# Patient Record
Sex: FEMALE | Race: WHITE | NOT HISPANIC OR LATINO | Employment: OTHER | URBAN - METROPOLITAN AREA
[De-identification: names, ages, dates, MRNs, and addresses within clinical notes are randomized per-mention and may not be internally consistent; named-entity substitution may affect disease eponyms.]

---

## 2017-08-02 ENCOUNTER — TELEPHONE (OUTPATIENT)
Dept: ENDOCRINOLOGY | Facility: CLINIC | Age: 27
End: 2017-08-02

## 2017-08-02 NOTE — TELEPHONE ENCOUNTER
----- Message from Marlene Selby sent at 8/2/2017  1:05 PM CDT -----  Contact: pt  [employer]      Deploying to [facility] 8/8    Can she be worked in   For female  dysthoria     Please call [phone]  Either way     Thanks

## 2017-08-03 ENCOUNTER — PATIENT MESSAGE (OUTPATIENT)
Dept: ENDOCRINOLOGY | Facility: CLINIC | Age: 27
End: 2017-08-03

## 2017-08-03 ENCOUNTER — LAB VISIT (OUTPATIENT)
Dept: LAB | Facility: HOSPITAL | Age: 27
End: 2017-08-03
Attending: INTERNAL MEDICINE
Payer: OTHER GOVERNMENT

## 2017-08-03 ENCOUNTER — OFFICE VISIT (OUTPATIENT)
Dept: ENDOCRINOLOGY | Facility: CLINIC | Age: 27
End: 2017-08-03
Payer: OTHER GOVERNMENT

## 2017-08-03 VITALS
HEIGHT: 64 IN | HEART RATE: 64 BPM | DIASTOLIC BLOOD PRESSURE: 80 MMHG | WEIGHT: 165.13 LBS | SYSTOLIC BLOOD PRESSURE: 110 MMHG | BODY MASS INDEX: 28.19 KG/M2

## 2017-08-03 DIAGNOSIS — R79.89 ABNORMAL THYROID BLOOD TEST: Primary | ICD-10-CM

## 2017-08-03 LAB
25(OH)D3+25(OH)D2 SERPL-MCNC: 28 NG/ML
ALBUMIN SERPL BCP-MCNC: 4 G/DL
ALP SERPL-CCNC: 78 U/L
ALT SERPL W/O P-5'-P-CCNC: 75 U/L
ANION GAP SERPL CALC-SCNC: 7 MMOL/L
AST SERPL-CCNC: 39 U/L
BASOPHILS # BLD AUTO: 0.02 K/UL
BASOPHILS NFR BLD: 0.3 %
BILIRUB SERPL-MCNC: 0.8 MG/DL
BUN SERPL-MCNC: 10 MG/DL
CALCIUM SERPL-MCNC: 9.6 MG/DL
CHLORIDE SERPL-SCNC: 105 MMOL/L
CHOLEST/HDLC SERPL: 3.2 {RATIO}
CO2 SERPL-SCNC: 28 MMOL/L
CREAT SERPL-MCNC: 0.8 MG/DL
DIFFERENTIAL METHOD: ABNORMAL
EOSINOPHIL # BLD AUTO: 0.1 K/UL
EOSINOPHIL NFR BLD: 0.9 %
ERYTHROCYTE [DISTWIDTH] IN BLOOD BY AUTOMATED COUNT: 12.7 %
EST. GFR  (AFRICAN AMERICAN): >60 ML/MIN/1.73 M^2
EST. GFR  (NON AFRICAN AMERICAN): >60 ML/MIN/1.73 M^2
ESTIMATED AVG GLUCOSE: 94 MG/DL
GLUCOSE SERPL-MCNC: 85 MG/DL
HBA1C MFR BLD HPLC: 4.9 %
HCT VFR BLD AUTO: 39.7 %
HDL/CHOLESTEROL RATIO: 31.3 %
HDLC SERPL-MCNC: 195 MG/DL
HDLC SERPL-MCNC: 61 MG/DL
HGB BLD-MCNC: 13.6 G/DL
LDLC SERPL CALC-MCNC: 121.4 MG/DL
LYMPHOCYTES # BLD AUTO: 1.6 K/UL
LYMPHOCYTES NFR BLD: 26.5 %
MCH RBC QN AUTO: 32.2 PG
MCHC RBC AUTO-ENTMCNC: 34.3 G/DL
MCV RBC AUTO: 94 FL
MONOCYTES # BLD AUTO: 0.2 K/UL
MONOCYTES NFR BLD: 3.9 %
NEUTROPHILS # BLD AUTO: 4 K/UL
NEUTROPHILS NFR BLD: 68.2 %
NONHDLC SERPL-MCNC: 134 MG/DL
PLATELET # BLD AUTO: 260 K/UL
PMV BLD AUTO: 10.2 FL
POTASSIUM SERPL-SCNC: 4.6 MMOL/L
PROT SERPL-MCNC: 7.7 G/DL
RBC # BLD AUTO: 4.22 M/UL
SODIUM SERPL-SCNC: 140 MMOL/L
T4 FREE SERPL-MCNC: 0.71 NG/DL
TRIGL SERPL-MCNC: 63 MG/DL
TSH SERPL DL<=0.005 MIU/L-ACNC: 4.28 UIU/ML
WBC # BLD AUTO: 5.85 K/UL

## 2017-08-03 PROCEDURE — 99999 PR PBB SHADOW E&M-EST. PATIENT-LVL III: CPT | Mod: PBBFAC,,, | Performed by: INTERNAL MEDICINE

## 2017-08-03 PROCEDURE — 36415 COLL VENOUS BLD VENIPUNCTURE: CPT

## 2017-08-03 PROCEDURE — 80053 COMPREHEN METABOLIC PANEL: CPT

## 2017-08-03 PROCEDURE — 3008F BODY MASS INDEX DOCD: CPT | Mod: ,,, | Performed by: INTERNAL MEDICINE

## 2017-08-03 PROCEDURE — 99204 OFFICE O/P NEW MOD 45 MIN: CPT | Mod: S$PBB,,, | Performed by: INTERNAL MEDICINE

## 2017-08-03 PROCEDURE — 80061 LIPID PANEL: CPT

## 2017-08-03 PROCEDURE — 84439 ASSAY OF FREE THYROXINE: CPT

## 2017-08-03 PROCEDURE — 85025 COMPLETE CBC W/AUTO DIFF WBC: CPT

## 2017-08-03 PROCEDURE — 82306 VITAMIN D 25 HYDROXY: CPT

## 2017-08-03 PROCEDURE — 83036 HEMOGLOBIN GLYCOSYLATED A1C: CPT

## 2017-08-03 PROCEDURE — 84443 ASSAY THYROID STIM HORMONE: CPT

## 2017-08-03 RX ORDER — TESTOSTERONE CYPIONATE 200 MG/ML
50 INJECTION, SOLUTION INTRAMUSCULAR
Qty: 10 ML | Refills: 5 | Status: SHIPPED | OUTPATIENT
Start: 2017-08-03 | End: 2017-10-26 | Stop reason: SDUPTHER

## 2017-08-03 RX ORDER — TESTOSTERONE CYPIONATE 200 MG/ML
50 INJECTION, SOLUTION INTRAMUSCULAR
Status: COMPLETED | OUTPATIENT
Start: 2017-08-03 | End: 2017-08-03

## 2017-08-03 RX ADMIN — TESTOSTERONE CYPIONATE 50 MG: 200 INJECTION, SOLUTION INTRAMUSCULAR at 10:08

## 2017-08-03 NOTE — PROGRESS NOTES
Subjective:      Patient ID: Jacey Sosa is a 26 y.o. transman.    Chief Complaint:  Other (Gender dysphoria)      History of Present Illness   With regards to his gender incongruence care:  He is in the  and will be deployed soon to [place]     First identified as a man in early his early teens   Gender identity - male  Current Therapy / counselor - Nicole Ingram, PhD Kady KEENAN    Therapist at the time hormones were started:  Yes   Letter was provided attesting to readiness for TRT      Gender Surgeries - none, but interested  Desired interventions:  Top surgery       Fertility concerns - may be interested but understands risks of trt     Has not Started hormone therapy want to today :        LMP:  Mid July 2017   Had a gyn eval in the past - last was end of July 2017          Social:  Work -        Relationship, orientation -  Single currently. With men and women           No dx of depression  Or anxiety     New concerns today:  None            Review of Systems   Constitutional: Negative for unexpected weight change.   Eyes: Negative for visual disturbance.   Respiratory: Negative for shortness of breath.    Cardiovascular: Negative for chest pain.   Gastrointestinal: Negative for abdominal pain.   Musculoskeletal: Negative for arthralgias.   Skin: Negative for wound.   Neurological: Negative for headaches.   Hematological: Does not bruise/bleed easily.   Psychiatric/Behavioral: Negative for sleep disturbance.       Objective:   Physical Exam   Constitutional: She appears well-developed.   HENT:   Right Ear: External ear normal.   Left Ear: External ear normal.   Nose: Nose normal.   Hearing normal  Dentition good    Neck: No tracheal deviation present. No thyromegaly present.   Cardiovascular: Normal rate.    No murmur heard.  Pulmonary/Chest: Effort normal and breath sounds normal.   Abdominal: Soft. There is no tenderness. No hernia.   Musculoskeletal: She exhibits no edema.   Gait  normal  No clubbing or cyanosis noted   Neurological: She displays normal reflexes. No cranial nerve deficit.   Skin: No rash noted.   No nodules       Psychiatric: She has a normal mood and affect. Judgment normal.   Vitals reviewed.      Body mass index is 28.34 kg/m².    Lab Review:   No results found for: HGBA1C  No results found for: CHOL, HDL, LDLCALC, TRIG, CHOLHDL  No results found for: NA, K, CL, CO2, GLU, BUN, CREATININE, CALCIUM, PROT, ALBUMIN, BILITOT, ALKPHOS, AST, ALT, ANIONGAP, ESTGFRAFRICA, EGFRNONAA, TSH    Assessment and Plan     Endocrine disorder in female-to-male transgender person  Transman: gender incongruence   Reviewed therapy, side effects (both wanted and unwanted), possible adverse outcomes, expectations, compliance.  Reviewed limited data on fertility, reviewed that fertility may be compromised with therapy over time. recommend consideration of egg preservation and a consult with a fertility specialist.     Reviewed the need for contraception as testosterone is not a contraceptive if having vaginal sex with non-trans men      Patient encouraged to continue working with his therapist during the transition process.      Will start   Testosterone replacement therapy 50 mg q 1 week   Self administration taught in clinic today.  Gave testosterone 50 mg im once   Labs in 3 months (external)   rtc 6 months as he will be in [facility]   Noting  Nl hh/ for men is:     Hemoglobin 14.0-18.0 g/dL    Hematocrit 40.0-54.0 %        Healthy lifestyle stressed   Consent signed in clinic    discussed that if acne is an issue and Accutane is used he would need to sign iPledge

## 2017-08-03 NOTE — PROGRESS NOTES
Depo testosterone administered per MAR.  Patient supplied medication Rx, IM injection teaching given to patient verbalized understanding.

## 2017-08-03 NOTE — ASSESSMENT & PLAN NOTE
Transman: gender incongruence   Reviewed therapy, side effects (both wanted and unwanted), possible adverse outcomes, expectations, compliance.  Reviewed limited data on fertility, reviewed that fertility will likely be compromised with therapy over time. recommend consideration of egg preservation and a consult with a fertility specialist.     Reviewed the need for contraception as testosterone is not a contraceptive if having vaginal sex with non-trans men      Patient encouraged to continue working with his therapist during the transition process.      Will start   Testosterone replacement therapy 50 mg q 1 week   Self administration taught in clinic today.  Gave testosterone 50 mg im once   Labs in 3 months (external)   rtc 6 months as he will be in [facility]   Noting  Nl hh/ for men is:     Hemoglobin 14.0-18.0 g/dL    Hematocrit 40.0-54.0 %        Healthy lifestyle stressed   Consent signed in clinic    discussed that if acne is an issue and Accutane is used he would need to sign iPledge

## 2017-08-08 ENCOUNTER — PATIENT MESSAGE (OUTPATIENT)
Dept: ENDOCRINOLOGY | Facility: CLINIC | Age: 27
End: 2017-08-08

## 2017-08-20 ENCOUNTER — PATIENT MESSAGE (OUTPATIENT)
Dept: ENDOCRINOLOGY | Facility: CLINIC | Age: 27
End: 2017-08-20

## 2017-08-21 ENCOUNTER — TELEPHONE (OUTPATIENT)
Dept: ENDOCRINOLOGY | Facility: CLINIC | Age: 27
End: 2017-08-21

## 2017-08-21 NOTE — TELEPHONE ENCOUNTER
Attempted to call patient regarding a question about medication being absorbed post injection. Unable to leave message due to voicemail saying this person is unavailable and cannot receive messages at this time.

## 2017-10-23 ENCOUNTER — PATIENT MESSAGE (OUTPATIENT)
Dept: ENDOCRINOLOGY | Facility: CLINIC | Age: 27
End: 2017-10-23

## 2017-10-26 ENCOUNTER — PATIENT MESSAGE (OUTPATIENT)
Dept: DIABETES | Facility: CLINIC | Age: 27
End: 2017-10-26

## 2017-10-26 ENCOUNTER — PATIENT MESSAGE (OUTPATIENT)
Dept: ENDOCRINOLOGY | Facility: CLINIC | Age: 27
End: 2017-10-26

## 2017-10-26 DIAGNOSIS — E55.9 VITAMIN D DEFICIENCY: ICD-10-CM

## 2017-10-26 DIAGNOSIS — R79.89 ABNORMAL THYROID BLOOD TEST: ICD-10-CM

## 2017-10-26 RX ORDER — TESTOSTERONE CYPIONATE 200 MG/ML
100 INJECTION, SOLUTION INTRAMUSCULAR
Qty: 10 ML | Refills: 5 | Status: SHIPPED | OUTPATIENT
Start: 2017-10-26 | End: 2018-02-15 | Stop reason: SDUPTHER

## 2017-10-26 NOTE — TELEPHONE ENCOUNTER
Patient getting labs done by their primary care doctor. Was notified of the additional labs that are ordered via patient portal

## 2017-11-10 ENCOUNTER — PATIENT MESSAGE (OUTPATIENT)
Dept: ENDOCRINOLOGY | Facility: CLINIC | Age: 27
End: 2017-11-10

## 2017-11-14 ENCOUNTER — PATIENT MESSAGE (OUTPATIENT)
Dept: ENDOCRINOLOGY | Facility: CLINIC | Age: 27
End: 2017-11-14

## 2017-12-05 ENCOUNTER — TELEPHONE (OUTPATIENT)
Dept: ENDOCRINOLOGY | Facility: CLINIC | Age: 27
End: 2017-12-05

## 2017-12-05 NOTE — TELEPHONE ENCOUNTER
----- Message from Carly Valdivia sent at 12/5/2017 10:48 AM CST -----  Contact: Lesley / .[facility], [phone]  Lesley is requesting clinical notes on Pt.     [phone] (fax)

## 2018-01-19 ENCOUNTER — PATIENT MESSAGE (OUTPATIENT)
Dept: ENDOCRINOLOGY | Facility: CLINIC | Age: 28
End: 2018-01-19

## 2018-02-08 ENCOUNTER — LAB VISIT (OUTPATIENT)
Dept: LAB | Facility: HOSPITAL | Age: 28
End: 2018-02-08
Attending: INTERNAL MEDICINE
Payer: OTHER GOVERNMENT

## 2018-02-08 DIAGNOSIS — R79.89 ABNORMAL THYROID BLOOD TEST: ICD-10-CM

## 2018-02-08 DIAGNOSIS — E55.9 VITAMIN D DEFICIENCY: ICD-10-CM

## 2018-02-08 LAB
ALBUMIN SERPL BCP-MCNC: 3.7 G/DL
ALP SERPL-CCNC: 57 U/L
ALT SERPL W/O P-5'-P-CCNC: 37 U/L
ANION GAP SERPL CALC-SCNC: 7 MMOL/L
AST SERPL-CCNC: 32 U/L
BASOPHILS # BLD AUTO: 0 K/UL
BASOPHILS NFR BLD: 0.5 %
BILIRUB SERPL-MCNC: 0.3 MG/DL
BUN SERPL-MCNC: 10 MG/DL
CALCIUM SERPL-MCNC: 9.1 MG/DL
CHLORIDE SERPL-SCNC: 106 MMOL/L
CHOLEST SERPL-MCNC: 185 MG/DL
CHOLEST/HDLC SERPL: 3.9 {RATIO}
CO2 SERPL-SCNC: 27 MMOL/L
CREAT SERPL-MCNC: 0.8 MG/DL
DIFFERENTIAL METHOD: ABNORMAL
EOSINOPHIL # BLD AUTO: 0.1 K/UL
EOSINOPHIL NFR BLD: 1.7 %
ERYTHROCYTE [DISTWIDTH] IN BLOOD BY AUTOMATED COUNT: 14.1 %
EST. GFR  (AFRICAN AMERICAN): >60 ML/MIN/1.73 M^2
EST. GFR  (NON AFRICAN AMERICAN): >60 ML/MIN/1.73 M^2
GLUCOSE SERPL-MCNC: 88 MG/DL
HCT VFR BLD AUTO: 45.3 %
HDLC SERPL-MCNC: 48 MG/DL
HDLC SERPL: 25.9 %
HGB BLD-MCNC: 14.9 G/DL
LDLC SERPL CALC-MCNC: 113.8 MG/DL
LYMPHOCYTES # BLD AUTO: 2 K/UL
LYMPHOCYTES NFR BLD: 29.9 %
MCH RBC QN AUTO: 31.3 PG
MCHC RBC AUTO-ENTMCNC: 33 G/DL
MCV RBC AUTO: 95 FL
MONOCYTES # BLD AUTO: 0.6 K/UL
MONOCYTES NFR BLD: 8.8 %
NEUTROPHILS # BLD AUTO: 4 K/UL
NEUTROPHILS NFR BLD: 59.1 %
NONHDLC SERPL-MCNC: 137 MG/DL
PLATELET # BLD AUTO: 242 K/UL
PMV BLD AUTO: 8.9 FL
POTASSIUM SERPL-SCNC: 3.9 MMOL/L
PROT SERPL-MCNC: 7.6 G/DL
RBC # BLD AUTO: 4.78 M/UL
SODIUM SERPL-SCNC: 140 MMOL/L
TESTOST SERPL-MCNC: 782 NG/DL
THYROPEROXIDASE IGG SERPL-ACNC: <6 IU/ML
TRIGL SERPL-MCNC: 116 MG/DL
TSH SERPL DL<=0.005 MIU/L-ACNC: 1.12 UIU/ML
WBC # BLD AUTO: 6.8 K/UL

## 2018-02-08 PROCEDURE — 80053 COMPREHEN METABOLIC PANEL: CPT

## 2018-02-08 PROCEDURE — 84403 ASSAY OF TOTAL TESTOSTERONE: CPT

## 2018-02-08 PROCEDURE — 80061 LIPID PANEL: CPT

## 2018-02-08 PROCEDURE — 84443 ASSAY THYROID STIM HORMONE: CPT

## 2018-02-08 PROCEDURE — 85025 COMPLETE CBC W/AUTO DIFF WBC: CPT

## 2018-02-08 PROCEDURE — 86376 MICROSOMAL ANTIBODY EACH: CPT

## 2018-02-08 PROCEDURE — 36415 COLL VENOUS BLD VENIPUNCTURE: CPT

## 2018-02-15 ENCOUNTER — OFFICE VISIT (OUTPATIENT)
Dept: ENDOCRINOLOGY | Facility: CLINIC | Age: 28
End: 2018-02-15
Payer: OTHER GOVERNMENT

## 2018-02-15 VITALS
SYSTOLIC BLOOD PRESSURE: 110 MMHG | DIASTOLIC BLOOD PRESSURE: 80 MMHG | HEART RATE: 84 BPM | BODY MASS INDEX: 28.34 KG/M2 | HEIGHT: 64 IN | WEIGHT: 166 LBS

## 2018-02-15 DIAGNOSIS — E55.9 VITAMIN D DEFICIENCY: ICD-10-CM

## 2018-02-15 DIAGNOSIS — E03.9 ACQUIRED HYPOTHYROIDISM: ICD-10-CM

## 2018-02-15 PROCEDURE — 99213 OFFICE O/P EST LOW 20 MIN: CPT | Mod: PBBFAC | Performed by: INTERNAL MEDICINE

## 2018-02-15 PROCEDURE — 3008F BODY MASS INDEX DOCD: CPT | Mod: ,,, | Performed by: INTERNAL MEDICINE

## 2018-02-15 PROCEDURE — 99999 PR PBB SHADOW E&M-EST. PATIENT-LVL III: CPT | Mod: PBBFAC,,, | Performed by: INTERNAL MEDICINE

## 2018-02-15 PROCEDURE — 99214 OFFICE O/P EST MOD 30 MIN: CPT | Mod: S$PBB,,, | Performed by: INTERNAL MEDICINE

## 2018-02-15 RX ORDER — TESTOSTERONE CYPIONATE 200 MG/ML
100 INJECTION, SOLUTION INTRAMUSCULAR
Qty: 10 ML | Refills: 5 | Status: SHIPPED | OUTPATIENT
Start: 2018-02-15 | End: 2018-11-07 | Stop reason: SDUPTHER

## 2018-02-15 RX ORDER — LEVOTHYROXINE SODIUM 50 UG/1
50 TABLET ORAL 2 TIMES DAILY
COMMUNITY
End: 2019-02-06

## 2018-02-15 RX ORDER — ACETAMINOPHEN 500 MG
1 TABLET ORAL DAILY
Start: 2018-02-15 | End: 2019-02-06

## 2018-02-15 NOTE — ASSESSMENT & PLAN NOTE
Clinically and biochemically euthyroid  Goal is a normal TSH  Avoid exogenous hyperthyroidism as this can accelerate bone loss and increase risk of CV complications.    Reviewed usual  times of thyroid hormone  Changes  Check yearly

## 2018-02-15 NOTE — ASSESSMENT & PLAN NOTE
Transman: gender incongruence   Reviewed therapy, side effects (both wanted and unwanted), possible adverse outcomes, expectations, compliance.  Reviewed limited data on fertility     Reviewed the need for contraception as testosterone is not a contraceptive if having vaginal sex with non-trans men       Will  Continue Testosterone replacement therapy 100 mg q 1 week      rtc 12 months    Noting  Nl hh/ for men is:     Hemoglobin 14.0-18.0 g/dL    Hematocrit 40.0-54.0 %        Healthy lifestyle stressed

## 2018-02-15 NOTE — PROGRESS NOTES
Subjective:      Patient ID: Jacey Sosa is a 27 y.o. female.    Chief Complaint:  Gender dysphoria      History of Present Illness  With regards to his gender incongruence care:  He is in the  and will be deployed soon to [place]      First identified as a man in early his early teens   Gender identity - male        Therapist at the time hormones were started:  Yes Therapy / counselor - Nicole Ingram, PhD Kady KEENAN - not currently seeing anyone   Letter was provided attesting to readiness for TRT       Gender Surgeries - none, but interested  Desired interventions:  Top surgery         Fertility concerns - may be interested but understands risks of trt      We started   Testosterone replacement therapy 8/3/17  Current dose  100 mg q 1 week         LMP: current   Had a gyn eval in the past - last was end of July 2017           Social:  Work -        Relationship, orientation -   currently with a woman. With men and women           No dx of depression  Or anxiety      New concerns today:  None       With regards to the vitamin d deficiency:  Currently not taking otc 2000 iu a day       With regards to  The hypothyroidism:    Current medication:     generic lt4 100 mcg qd   Was found to have an elevated tsh on routine labs  tpo was negative      current symptoms:  None      No weight gain  No Fatigue   No Constipation               Review of Systems   Constitutional: Negative for unexpected weight change.   Eyes: Negative for visual disturbance.   Respiratory: Negative for shortness of breath.    Cardiovascular: Negative for chest pain.   Gastrointestinal: Negative for abdominal pain.   Musculoskeletal: Negative for arthralgias.   Skin: Negative for wound.   Neurological: Negative for headaches.   Hematological: Does not bruise/bleed easily.   Psychiatric/Behavioral: Negative for sleep disturbance.       Objective:   Physical Exam   Neck: No thyromegaly present.   Cardiovascular: Normal rate.     Pulmonary/Chest: Effort normal.   Abdominal: Soft.   Musculoskeletal: She exhibits no edema.   Vitals reviewed.      Body mass index is 28.49 kg/m².    Lab Review:   Lab Results   Component Value Date    HGBA1C 4.9 08/03/2017     Lab Results   Component Value Date    CHOL 185 02/08/2018    HDL 48 02/08/2018    LDLCALC 113.8 02/08/2018    TRIG 116 02/08/2018    CHOLHDL 25.9 02/08/2018     Lab Results   Component Value Date     02/08/2018    K 3.9 02/08/2018     02/08/2018    CO2 27 02/08/2018    GLU 88 02/08/2018    BUN 10 02/08/2018    CREATININE 0.8 02/08/2018    CALCIUM 9.1 02/08/2018    PROT 7.6 02/08/2018    ALBUMIN 3.7 02/08/2018    BILITOT 0.3 02/08/2018    ALKPHOS 57 02/08/2018    AST 32 02/08/2018    ALT 37 02/08/2018    ANIONGAP 7 (L) 02/08/2018    ESTGFRAFRICA >60 02/08/2018    EGFRNONAA >60 02/08/2018    TSH 1.123 02/08/2018       Assessment and Plan     Vitamin D deficiency  Start over the counter vitamin D 2000 iu a day          Acquired hypothyroidism  Clinically and biochemically euthyroid  Goal is a normal TSH  Avoid exogenous hyperthyroidism as this can accelerate bone loss and increase risk of CV complications.    Reviewed usual  times of thyroid hormone  Changes  Check yearly     Endocrine disorder in female-to-male transgender person  Transman: gender incongruence   Reviewed therapy, side effects (both wanted and unwanted), possible adverse outcomes, expectations, compliance.  Reviewed limited data on fertility     Reviewed the need for contraception as testosterone is not a contraceptive if having vaginal sex with non-trans men       Will  Continue Testosterone replacement therapy 100 mg q 1 week      rtc 12 months    Noting  Nl hh/ for men is:     Hemoglobin 14.0-18.0 g/dL    Hematocrit 40.0-54.0 %        Healthy lifestyle stressed

## 2018-09-25 ENCOUNTER — TELEPHONE (OUTPATIENT)
Dept: ENDOCRINOLOGY | Facility: CLINIC | Age: 28
End: 2018-09-25

## 2018-09-25 NOTE — TELEPHONE ENCOUNTER
----- Message from Keisha Boyd sent at 9/25/2018 10:54 AM CDT -----  Contact: pt [phone]  Pt called to schedule his annual visit in February 2019.  Nothing was available.  Please call pt at your earliest convenience at [phone]    Thanks  odilia

## 2018-09-25 NOTE — TELEPHONE ENCOUNTER
Patient notified that he is due back in February and those schedules are not opened yet  Try back in late November or early December.

## 2018-11-06 ENCOUNTER — PATIENT MESSAGE (OUTPATIENT)
Dept: ENDOCRINOLOGY | Facility: CLINIC | Age: 28
End: 2018-11-06

## 2018-11-06 DIAGNOSIS — F64.0 GENDER DYSPHORIA IN ADULT: Primary | ICD-10-CM

## 2018-11-07 RX ORDER — TESTOSTERONE CYPIONATE 200 MG/ML
100 INJECTION, SOLUTION INTRAMUSCULAR
Qty: 10 ML | Refills: 5 | Status: SHIPPED | OUTPATIENT
Start: 2018-11-07 | End: 2019-02-06

## 2018-12-17 ENCOUNTER — TELEPHONE (OUTPATIENT)
Dept: ENDOCRINOLOGY | Facility: CLINIC | Age: 28
End: 2018-12-17

## 2018-12-17 NOTE — TELEPHONE ENCOUNTER
----- Message from Carly Valdivia sent at 12/17/2018  8:24 AM CST -----  Contact: Self [phone]  PT called to schedule a fu in appointment in Feb. Pt can be reached at [phone].

## 2019-01-31 ENCOUNTER — LAB VISIT (OUTPATIENT)
Dept: LAB | Facility: HOSPITAL | Age: 29
End: 2019-01-31
Attending: INTERNAL MEDICINE
Payer: OTHER GOVERNMENT

## 2019-01-31 DIAGNOSIS — E55.9 VITAMIN D DEFICIENCY: ICD-10-CM

## 2019-01-31 DIAGNOSIS — E03.9 ACQUIRED HYPOTHYROIDISM: ICD-10-CM

## 2019-01-31 LAB
25(OH)D3+25(OH)D2 SERPL-MCNC: 23 NG/ML
ALBUMIN SERPL BCP-MCNC: 4 G/DL
ALP SERPL-CCNC: 80 U/L
ALT SERPL W/O P-5'-P-CCNC: 82 U/L
ANION GAP SERPL CALC-SCNC: 8 MMOL/L
AST SERPL-CCNC: 42 U/L
BASOPHILS # BLD AUTO: 0 K/UL
BASOPHILS NFR BLD: 0 %
BILIRUB SERPL-MCNC: 0.8 MG/DL
BUN SERPL-MCNC: 11 MG/DL
CALCIUM SERPL-MCNC: 9.7 MG/DL
CHLORIDE SERPL-SCNC: 104 MMOL/L
CHOLEST SERPL-MCNC: 187 MG/DL
CHOLEST/HDLC SERPL: 6.2 {RATIO}
CO2 SERPL-SCNC: 27 MMOL/L
CREAT SERPL-MCNC: 1 MG/DL
DIFFERENTIAL METHOD: ABNORMAL
EOSINOPHIL # BLD AUTO: 0.1 K/UL
EOSINOPHIL NFR BLD: 1.6 %
ERYTHROCYTE [DISTWIDTH] IN BLOOD BY AUTOMATED COUNT: 12.2 %
EST. GFR  (AFRICAN AMERICAN): >60 ML/MIN/1.73 M^2
EST. GFR  (NON AFRICAN AMERICAN): >60 ML/MIN/1.73 M^2
GLUCOSE SERPL-MCNC: 88 MG/DL
HCT VFR BLD AUTO: 46.4 %
HDLC SERPL-MCNC: 30 MG/DL
HDLC SERPL: 16 %
HGB BLD-MCNC: 15.6 G/DL
LDLC SERPL CALC-MCNC: 132.2 MG/DL
LYMPHOCYTES # BLD AUTO: 3 K/UL
LYMPHOCYTES NFR BLD: 50.1 %
MCH RBC QN AUTO: 31.7 PG
MCHC RBC AUTO-ENTMCNC: 33.7 G/DL
MCV RBC AUTO: 94 FL
MONOCYTES # BLD AUTO: 0.3 K/UL
MONOCYTES NFR BLD: 5.7 %
NEUTROPHILS # BLD AUTO: 2.5 K/UL
NEUTROPHILS NFR BLD: 42.6 %
NONHDLC SERPL-MCNC: 157 MG/DL
PLATELET # BLD AUTO: 236 K/UL
PMV BLD AUTO: 8.5 FL
POTASSIUM SERPL-SCNC: 4 MMOL/L
PROT SERPL-MCNC: 7.6 G/DL
RBC # BLD AUTO: 4.93 M/UL
SODIUM SERPL-SCNC: 139 MMOL/L
TRIGL SERPL-MCNC: 124 MG/DL
TSH SERPL DL<=0.005 MIU/L-ACNC: 3.33 UIU/ML
WBC # BLD AUTO: 5.9 K/UL

## 2019-01-31 PROCEDURE — 80061 LIPID PANEL: CPT

## 2019-01-31 PROCEDURE — 85025 COMPLETE CBC W/AUTO DIFF WBC: CPT

## 2019-01-31 PROCEDURE — 36415 COLL VENOUS BLD VENIPUNCTURE: CPT

## 2019-01-31 PROCEDURE — 82306 VITAMIN D 25 HYDROXY: CPT

## 2019-01-31 PROCEDURE — 80053 COMPREHEN METABOLIC PANEL: CPT

## 2019-01-31 PROCEDURE — 84443 ASSAY THYROID STIM HORMONE: CPT

## 2019-02-06 ENCOUNTER — OFFICE VISIT (OUTPATIENT)
Dept: ENDOCRINOLOGY | Facility: CLINIC | Age: 29
End: 2019-02-06
Payer: OTHER GOVERNMENT

## 2019-02-06 VITALS
SYSTOLIC BLOOD PRESSURE: 110 MMHG | HEIGHT: 64 IN | WEIGHT: 178.38 LBS | BODY MASS INDEX: 30.45 KG/M2 | DIASTOLIC BLOOD PRESSURE: 80 MMHG | HEART RATE: 66 BPM

## 2019-02-06 DIAGNOSIS — E03.9 ACQUIRED HYPOTHYROIDISM: ICD-10-CM

## 2019-02-06 DIAGNOSIS — E55.9 VITAMIN D DEFICIENCY: ICD-10-CM

## 2019-02-06 PROCEDURE — 99999 PR PBB SHADOW E&M-EST. PATIENT-LVL IV: ICD-10-PCS | Mod: PBBFAC,,, | Performed by: INTERNAL MEDICINE

## 2019-02-06 PROCEDURE — 99214 OFFICE O/P EST MOD 30 MIN: CPT | Mod: PBBFAC | Performed by: INTERNAL MEDICINE

## 2019-02-06 PROCEDURE — 99214 PR OFFICE/OUTPT VISIT, EST, LEVL IV, 30-39 MIN: ICD-10-PCS | Mod: S$PBB,,, | Performed by: INTERNAL MEDICINE

## 2019-02-06 PROCEDURE — 99214 OFFICE O/P EST MOD 30 MIN: CPT | Mod: S$PBB,,, | Performed by: INTERNAL MEDICINE

## 2019-02-06 PROCEDURE — 99999 PR PBB SHADOW E&M-EST. PATIENT-LVL IV: CPT | Mod: PBBFAC,,, | Performed by: INTERNAL MEDICINE

## 2019-02-06 RX ORDER — LEVOTHYROXINE SODIUM 50 UG/1
50 TABLET ORAL DAILY
Qty: 30 TABLET | Refills: 11 | Status: SHIPPED | OUTPATIENT
Start: 2019-02-06 | End: 2020-06-19 | Stop reason: SDUPTHER

## 2019-02-06 RX ORDER — TESTOSTERONE CYPIONATE 200 MG/ML
100 INJECTION, SOLUTION INTRAMUSCULAR
Qty: 10 ML | Refills: 5 | Status: SHIPPED | OUTPATIENT
Start: 2019-02-06 | End: 2019-09-17 | Stop reason: SDUPTHER

## 2019-02-06 RX ORDER — ACETAMINOPHEN 500 MG
1 TABLET ORAL DAILY
Start: 2019-02-06

## 2019-02-06 RX ORDER — MULTIVITAMIN
1 TABLET ORAL DAILY
COMMUNITY

## 2019-02-06 NOTE — PROGRESS NOTES
Subjective:      Patient ID: Jacey Sosa is a 28 y.o. female.    Chief Complaint:  Vitamin D Deficiency and Gender dysphoria      History of Present Illness  With regards to his gender incongruence care:  He is in the        First identified as a man in early his early teens   Gender identity - male        Therapist at the time hormones were started:  Yes Therapy / counselor - Nicole Ingram, PhD Kady KEENAN - not currently seeing anyone   Letter was provided attesting to readiness for TRT       Gender Surgeries - none, but interested  Desired interventions:  Top surgery         Fertility concerns - may be interested but understands risks of trt      We started   Testosterone replacement therapy 8/3/17  Current dose  100 mg q 1 week         LMP: spotting q 3 months   Had a gyn eval in the past - last was end of July 2017           Social:  Work -        Relationship, orientation -   currently with a woman. With men and women           No dx of depression  Or anxiety      New concerns today:  None       With regards to the vitamin d deficiency:  Currently not taking otc 2000 iu a day       With regards to  The hypothyroidism:     generic lt4 100 mcg qd  - off for 1 month   Was found to have an elevated tsh on routine labs  tpo was negative      current symptoms:  None      No weight gain  No Fatigue   No Constipation        Does not drink  No excessive tylenol  No abdominal pain or recent illness          Review of Systems   Constitutional: Negative for unexpected weight change.   Eyes: Negative for visual disturbance.   Respiratory: Negative for shortness of breath.    Cardiovascular: Negative for chest pain.   Gastrointestinal: Negative for abdominal pain.   Musculoskeletal: Negative for arthralgias.   Skin: Negative for wound.   Neurological: Negative for headaches.   Hematological: Does not bruise/bleed easily.   Psychiatric/Behavioral: Negative for sleep disturbance.       Objective:    Physical Exam   Neck: No thyromegaly present.   Cardiovascular: Normal rate.   Pulmonary/Chest: Effort normal.   Abdominal: Soft.   Musculoskeletal: She exhibits no edema.   Vitals reviewed.      Body mass index is 30.61 kg/m².    Lab Review:   Lab Results   Component Value Date    HGBA1C 4.9 08/03/2017     Lab Results   Component Value Date    CHOL 187 01/31/2019    HDL 30 (L) 01/31/2019    LDLCALC 132.2 01/31/2019    TRIG 124 01/31/2019    CHOLHDL 16.0 (L) 01/31/2019     Lab Results   Component Value Date     01/31/2019    K 4.0 01/31/2019     01/31/2019    CO2 27 01/31/2019    GLU 88 01/31/2019    BUN 11 01/31/2019    CREATININE 1.0 01/31/2019    CALCIUM 9.7 01/31/2019    PROT 7.6 01/31/2019    ALBUMIN 4.0 01/31/2019    BILITOT 0.8 01/31/2019    ALKPHOS 80 01/31/2019    AST 42 (H) 01/31/2019    ALT 82 (H) 01/31/2019    ANIONGAP 8 01/31/2019    ESTGFRAFRICA >60 01/31/2019    EGFRNONAA >60 01/31/2019    TSH 3.325 01/31/2019       Assessment and Plan     Endocrine disorder in female-to-male transgender person  Transman: gender incongruence   Reviewed therapy, side effects (both wanted and unwanted), possible adverse outcomes, expectations, compliance.  Reviewed limited data on fertility     Reviewed the need for contraception as testosterone is not a contraceptive if having vaginal sex with non-trans men       Will  Continue Testosterone replacement therapy 100 mg q 1 week      rtc 12 months    Noting  Nl hh/ for men is:     Hemoglobin 14.0-18.0 g/dL    Hematocrit 40.0-54.0 %        Healthy lifestyle stressed      Refer to Dr Mckoy         Vitamin D deficiency  over the counter vitamin D 2000 iu a day          Acquired hypothyroidism  abnormal TSH  reviewed indications for starting  lt4 therapy (tsh > 10, goiter, symptoms, pregnancy)    He would like to restart - 50 mcg qd  tsh in 2 months     BMI 30.0-30.9,adult    alerted as to the increased risk for t2DM  Stressed diet and exercise  Goal 5-7%  weight loss    Periodic hba1c levels    abnormal liver tests- avoid etoh and Tylenol  repeat tin 1 week

## 2019-02-06 NOTE — ASSESSMENT & PLAN NOTE
alerted as to the increased risk for t2DM  Stressed diet and exercise  Goal 5-7% weight loss    Periodic hba1c levels    
Transman: gender incongruence   Reviewed therapy, side effects (both wanted and unwanted), possible adverse outcomes, expectations, compliance.  Reviewed limited data on fertility     Reviewed the need for contraception as testosterone is not a contraceptive if having vaginal sex with non-trans men       Will  Continue Testosterone replacement therapy 100 mg q 1 week      rtc 12 months    Noting  Nl hh/ for men is:     Hemoglobin 14.0-18.0 g/dL    Hematocrit 40.0-54.0 %        Healthy lifestyle stressed      Refer to Dr Mckoy       
abnormal TSH  reviewed indications for starting  lt4 therapy (tsh > 10, goiter, symptoms, pregnancy)    He would like to restart - 50 mcg qd  tsh in 2 months   
over the counter vitamin D 2000 iu a day        
recheck in 5 years   [facility] Bir POLYPECTOMY  10/04/2018    2 Polyps Removed    SIGMOIDOSCOPY      UPPER GASTROINTESTINAL ENDOSCOPY  10/04/2018    with Biopsy    UPPER GASTROINTESTINAL ENDOSCOPY N/A 10/4/2018    EGD BIOPSY performed by Jay Shah MD at [address] [place] [address] History   Substance Use Topics    Smoking status: Current Every Day Smoker     Packs/day: 1.00     Years: 50.00     Types: Cigarettes    Smokeless tobacco: Never Used      Comment: starts the [facility] program 8/8/18 to stop smoking    Alcohol use 1.8 oz/week     3 Cans of beer per week      Comment: per day of 12 ozs       LAB REVIEW:  CBC:   Lab Results   Component Value Date    WBC 10.6 11/29/2018    HGB 11.7 11/29/2018    HCT 36.3 11/29/2018     11/29/2018     Lipids:   Lab Results   Component Value Date    CHOL 210 (H) 01/29/2015    TRIG 61 01/29/2015    HDL 77 01/29/2015    LDLDIRECT 129 (H) 01/29/2015     Renal:   Lab Results   Component Value Date    BUN 8 11/29/2018    CREATININE 0.6 11/29/2018     11/29/2018    K 4.6 11/29/2018    ALT 14 11/27/2018    AST 13 11/27/2018    GLUCOSE 156 11/29/2018     PT/INR: No results found for: INR  A1C:   Lab Results   Component Value Date    LABA1C 4.4 04/02/2018           Wilma Alvares MD, 12/17/2018 , 8:31 AM

## 2019-02-20 ENCOUNTER — LAB VISIT (OUTPATIENT)
Dept: LAB | Facility: HOSPITAL | Age: 29
End: 2019-02-20
Attending: INTERNAL MEDICINE
Payer: OTHER GOVERNMENT

## 2019-02-20 DIAGNOSIS — E55.9 VITAMIN D DEFICIENCY: ICD-10-CM

## 2019-02-20 DIAGNOSIS — E03.9 ACQUIRED HYPOTHYROIDISM: ICD-10-CM

## 2019-02-20 LAB
ALBUMIN SERPL BCP-MCNC: 4.2 G/DL
ALP SERPL-CCNC: 85 U/L
ALT SERPL W/O P-5'-P-CCNC: 118 U/L
ANION GAP SERPL CALC-SCNC: 9 MMOL/L
AST SERPL-CCNC: 47 U/L
BILIRUB SERPL-MCNC: 1.2 MG/DL
BUN SERPL-MCNC: 11 MG/DL
CALCIUM SERPL-MCNC: 9.9 MG/DL
CHLORIDE SERPL-SCNC: 103 MMOL/L
CO2 SERPL-SCNC: 28 MMOL/L
CREAT SERPL-MCNC: 0.9 MG/DL
EST. GFR  (AFRICAN AMERICAN): >60 ML/MIN/1.73 M^2
EST. GFR  (NON AFRICAN AMERICAN): >60 ML/MIN/1.73 M^2
GLUCOSE SERPL-MCNC: 67 MG/DL
POTASSIUM SERPL-SCNC: 4.1 MMOL/L
PROT SERPL-MCNC: 7.8 G/DL
SODIUM SERPL-SCNC: 140 MMOL/L

## 2019-02-20 PROCEDURE — 36415 COLL VENOUS BLD VENIPUNCTURE: CPT

## 2019-02-20 PROCEDURE — 80053 COMPREHEN METABOLIC PANEL: CPT

## 2019-02-25 ENCOUNTER — PATIENT MESSAGE (OUTPATIENT)
Dept: ENDOCRINOLOGY | Facility: CLINIC | Age: 29
End: 2019-02-25

## 2019-02-25 DIAGNOSIS — R74.8 ABNORMAL LIVER ENZYMES: ICD-10-CM

## 2019-02-27 ENCOUNTER — DOCUMENTATION ONLY (OUTPATIENT)
Dept: TRANSPLANT | Facility: CLINIC | Age: 29
End: 2019-02-27

## 2019-02-27 NOTE — NURSING
Pt records reviewed.   Pt will be referred to Hepatology.    Initial referral received  from the [facility]que.   Referring Provider/diagnosis    Referred To Provider:    QI ZEPEDA MD   Abnormal liver enzymes      Referral letter sent to patient.

## 2019-02-27 NOTE — LETTER
February 27, 2019    Michael Sosa  [address] [facility] [place] [address]      Dear Michael Sosa:    Your doctor has referred you to the [facility]. We are sending this letter to remind you to make an appointment with us to complete the referral process.     Please call us at [phone] to schedule an appointment. We look forward to seeing you soon.     If you received a call and have been scheduled, please disregard this letter.       Sincerely,        [facility]   [address] [place] [id number]  [phone]

## 2019-03-06 ENCOUNTER — OFFICE VISIT (OUTPATIENT)
Dept: HEPATOLOGY | Facility: CLINIC | Age: 29
End: 2019-03-06
Attending: INTERNAL MEDICINE
Payer: OTHER GOVERNMENT

## 2019-03-06 ENCOUNTER — HOSPITAL ENCOUNTER (OUTPATIENT)
Dept: RADIOLOGY | Facility: HOSPITAL | Age: 29
Discharge: HOME | End: 2019-03-06
Attending: NURSE PRACTITIONER
Payer: OTHER GOVERNMENT

## 2019-03-06 ENCOUNTER — PROCEDURE VISIT (OUTPATIENT)
Dept: HEPATOLOGY | Facility: CLINIC | Age: 29
End: 2019-03-06
Attending: NURSE PRACTITIONER
Payer: OTHER GOVERNMENT

## 2019-03-06 VITALS
SYSTOLIC BLOOD PRESSURE: 125 MMHG | OXYGEN SATURATION: 98 % | HEIGHT: 64 IN | BODY MASS INDEX: 30.75 KG/M2 | HEART RATE: 71 BPM | DIASTOLIC BLOOD PRESSURE: 65 MMHG | WEIGHT: 180.13 LBS

## 2019-03-06 DIAGNOSIS — R74.8 ELEVATED LIVER ENZYMES: Primary | ICD-10-CM

## 2019-03-06 DIAGNOSIS — R74.8 ELEVATED LIVER ENZYMES: ICD-10-CM

## 2019-03-06 PROCEDURE — 99999 PR PBB SHADOW E&M-EST. PATIENT-LVL IV: CPT | Mod: PBBFAC,,, | Performed by: NURSE PRACTITIONER

## 2019-03-06 PROCEDURE — 99204 PR OFFICE/OUTPT VISIT, NEW, LEVL IV, 45-59 MIN: ICD-10-PCS | Mod: S$PBB,,, | Performed by: NURSE PRACTITIONER

## 2019-03-06 PROCEDURE — 99204 OFFICE O/P NEW MOD 45 MIN: CPT | Mod: S$PBB,,, | Performed by: NURSE PRACTITIONER

## 2019-03-06 PROCEDURE — 99214 OFFICE O/P EST MOD 30 MIN: CPT | Mod: PBBFAC,25 | Performed by: NURSE PRACTITIONER

## 2019-03-06 PROCEDURE — 91200 LIVER ELASTOGRAPHY: CPT | Mod: 26,S$PBB,, | Performed by: NURSE PRACTITIONER

## 2019-03-06 PROCEDURE — 91200 LIVER ELASTOGRAPHY: CPT | Mod: PBBFAC | Performed by: NURSE PRACTITIONER

## 2019-03-06 PROCEDURE — 76700 US EXAM ABDOM COMPLETE: CPT | Mod: 26,,, | Performed by: RADIOLOGY

## 2019-03-06 PROCEDURE — 99999 PR PBB SHADOW E&M-EST. PATIENT-LVL IV: ICD-10-PCS | Mod: PBBFAC,,, | Performed by: NURSE PRACTITIONER

## 2019-03-06 PROCEDURE — 91200 PR LIVER ELASTOGRAPHY W/OUT IMAG W/INTERP & REPORT: ICD-10-PCS | Mod: 26,S$PBB,, | Performed by: NURSE PRACTITIONER

## 2019-03-06 PROCEDURE — 76700 US EXAM ABDOM COMPLETE: CPT | Mod: TC

## 2019-03-06 PROCEDURE — 76700 US ABDOMEN COMPLETE: ICD-10-PCS | Mod: 26,,, | Performed by: RADIOLOGY

## 2019-03-06 NOTE — PROGRESS NOTES
[facility] VISIT NEW PT NOTE    REFERRING PROVIDER: Dr. Owen    CHIEF COMPLAINT: elevated liver enzymes    HPI: This is a 28 y.o. White transmale with PMH as below referred for evaluation of elevated liver enzymes. Pt follows with endocrinology for gender dysphoria (born female, identifies as male). Has been on testosterone replacement therapy since 8/2017. Since establishing care in 8/2017, his transaminases have been intermittently mildly elevated. Essentially normal in 2/2018 but then since 1/2019 more elevated. Tbili 1.2 on most recent lab. Normal prior. Alk phos has been normal. He has not had any workup for this yet. His weight has increased since 8/2017. Was 165 lbs, now 178 lbs. 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 Has not had abd u/s. Besides TRT, only med is on Synthroid. Denies any herbal supplements. No current significant alcohol use. Reports he used to binge drink when he would go out. Has completely quit alcohol since 11/2018. He is working on losing weight, recently started diet. He feels well.   Denies jaundice, dark urine, abdominal distention, hematemesis, melena, slowed mentation.        Review of patient's allergies indicates:  No Known Allergies    Current Outpatient Medications on File Prior to Visit   Medication Sig Dispense Refill    cholecalciferol, vitamin D3, (VITAMIN D3) 2,000 unit Cap Take 1 capsule (2,000 Units total) by mouth once daily.      levothyroxine (SYNTHROID) 50 MCG tablet Take 1 tablet (50 mcg total) by mouth once daily. 30 tablet 11    multivitamin (ONE DAILY MULTIVITAMIN) per tablet Take 1 tablet by mouth once daily. Has 800 units of Vitamin D      testosterone cypionate (DEPOTESTOTERONE CYPIONATE) 200 mg/mL injection Inject 0.5 mLs (100 mg total) into the muscle every 7 days. 3 ml syringe with 18 g needle  to draw  X 10   25 g 1 inch needle to inject x 10 10 mL 5     No current facility-administered  "medications on file prior to visit.        PMHX:  has a past medical history of Gender dysphoria in adult and Hypothyroidism.    PSHX:  has a past surgical history that includes Tympanostomy tube placement and [place] tooth extraction.    FAMILY HISTORY: Negative for liver disease, reviewed in [facility]    SOCIAL HISTORY:   Social History     Tobacco Use   Smoking Status Former Smoker   Smokeless Tobacco Former User       Social History     Substance and Sexual Activity   Alcohol Use Yes       Social History     Substance and Sexual Activity   Drug Use No     He is in [employer], now in reserves.     ROS:   GENERAL: Denies fever, chills, (+) weight gain, no fatigue  HEENT: Denies headaches, dizziness, vision/hearing changes  CARDIOVASCULAR: Denies chest pain, palpitations, or edema  RESPIRATORY: Denies dyspnea, cough  GI: Denies abdominal pain, rectal bleeding, nausea, vomiting. No change in bowel pattern or color  : Denies dysuria, hematuria   SKIN: Denies rash, itching   NEURO: Denies confusion, memory loss, or mood changes  PSYCH: Denies depression or anxiety  HEME/LYMPH: Denies easy bruising or bleeding        PHYSICAL EXAM:   Friendly White transmale, in no acute distress; alert and oriented to person, place and time  VITALS: /65 (BP Location: Right arm, Patient Position: Sitting, BP Method: Large (Automatic))   Pulse 71   Ht 5' 4" (1.626 m)   Wt 81.7 kg (180 lb 1.9 oz)   SpO2 98%   BMI 30.92 kg/m²    HENT: Normocephalic, without obvious abnormality. Oral mucosa pink and moist. Dentition good.  EYES: Sclerae anicteric. No conjunctival pallor.   NECK: Supple. No masses or cervical adenopathy.  CARDIOVASCULAR: Regular rate and rhythm. No murmurs.  RESPIRATORY: Normal respiratory effort. BBS CTA. No wheezes or crackles.  GI: Soft, non-tender, non-distended. No hepatosplenomegaly. No masses palpable. No ascites.  EXTREMITIES:  No clubbing, cyanosis or edema.  SKIN: Warm and dry. No jaundice. No rashes noted to " exposed skin. No telangectasias noted. No palmar erythema.  NEURO:  Normal gate. No asterixis.  PSYCH:  Memory intact. Thought and speech pattern appropriate. Behavior normal. No depression or anxiety noted.      RECENT LABS:    Labs:  Lab Results   Component Value Date    WBC 5.90 01/31/2019    HGB 15.6 01/31/2019    HCT 46.4 01/31/2019     01/31/2019    CHOL 187 01/31/2019    TRIG 124 01/31/2019    HDL 30 (L) 01/31/2019     02/20/2019    K 4.1 02/20/2019    CREATININE 0.9 02/20/2019     (H) 02/20/2019    AST 47 (H) 02/20/2019    ALKPHOS 85 02/20/2019    BILITOT 1.2 (H) 02/20/2019    ALBUMIN 4.2 02/20/2019       DIAGNOSTIC STUDIES: none  EGD-  COLONOSCOPY-  ABD. U/S-  CT SCAN-  MRI-  LIVER BIOPSY-    ASSESSMENT:  28 y.o. white transmale with:  1.  Elevated liver enzymes, hepatocellular pattern  -- will check full serologic workup and abd u/s  -- weight has increased some over past 1.5 yrs, could be d/t NAFLD  -- has been on TRT since 8/2017  2. Gender dysphoria  -- following with endocrine, started on TRT 8/2017  -- TRT could be possibly contributing to enzyme elevation but no need to d/c at this time. May need trial off TRT in future if no other clear etiology      EDUCATION:   We discussed the manifestations of NAFLD. At this time there is no FDA approved therapy for NAFLD.   The patient and I discussed the importance of diet, exercise, and weight loss for management of NAFLD. We discussed a low fat, low carb/low sugar, high fiber diet, and a goal of 30 minutes of exercise 5 times per week.   Pt is aware that fatty liver can progress to steatohepatitis and possibly to cirrhosis, putting one at increased risk for liver cancer, liver failure, and death.        PLAN:  1. Labs today  2. Abd u/s  3. Fibroscan to assess fibrosis  4. Recommend weight loss in case this is d/t NAFLD  5. Ok to continue TRT for now  6. F/u pending results      Thank you for allowing me to participate in the care of  Jacey Rodriguez, NP-C    Addendum: Fibroscan today = kPa 5.2, F0-F1, no fibrosis. CAP = 172, < 11% steatosis. Discussed results with pt. Will await results of labs and u/s.

## 2019-03-06 NOTE — PROGRESS NOTES
I have personally performed a face to face diagnostic evaluation on this patient. I have reviewed and agree with today's findings and the care plan outlined by Jennifer Rodriguez NP with following comments:    Mr. Sosa is a 28 year old transgender male (biological female) on testosterone injections was referred to Hepatology for elevated liver enzymes. His BMI is 30. Dx includes BAIG/NAFLD vs testosterone-induced vs others. Agree with work up: serologies/autoimmune markers and ultrasound. After other etiologies are ruled out, may need to avoid testosterone injections to see if these are related to his liver test abnormalities.     The patient will return to Jennifer Rodriguez NP  for follow-up.     Mary Mayers MD   Hepatology  [facility]

## 2019-03-06 NOTE — PROCEDURES
Fibroscan Procedure     Name: Jacey Sosa  Date of Procedure : 2019   :: Jennifer Rodriguez NP  Diagnosis: elevated liver enzymes    Probe: M    Fibroscan readin.2 KPa    Fibrosis:F 0-1     CAP readin dB/m    Steatosis: :<S1

## 2019-03-06 NOTE — LETTER
March 6, 2019      Paty Owen MD  [address]  [place] [address]           [facility] - Hepatology  [address]  [place] [address]  Phone: [phone]  Fax: [phone]          Patient: Jacey Sosa   MR Number: [id number]   YOB: 1990   Date of Visit: 3/6/2019       Dear Dr. Paty Owen:    Thank you for referring Jacey Sosa to me for evaluation. Attached you will find relevant portions of my assessment and plan of care.    If you have questions, please do not hesitate to call me. I look forward to following Jacey Sosa along with you.    Sincerely,    Jennifer Rodriguez, NP    Enclosure  CC:  No Recipients    If you would like to receive this communication electronically, please contact [email or web address] or [phone] to request more information on [facility] access.    For providers and/or their staff who would like to refer a patient to [facility], please contact us through our one-stop-shop provider referral line, [facility], at [phone].    If you feel you have received this communication in error or would no longer like to receive these types of communications, please e-mail [email or web address]

## 2019-03-07 ENCOUNTER — PATIENT MESSAGE (OUTPATIENT)
Dept: HEPATOLOGY | Facility: CLINIC | Age: 29
End: 2019-03-07

## 2019-03-13 ENCOUNTER — PATIENT MESSAGE (OUTPATIENT)
Dept: HEPATOLOGY | Facility: CLINIC | Age: 29
End: 2019-03-13

## 2019-03-13 DIAGNOSIS — R74.8 ELEVATED LIVER ENZYMES: Primary | ICD-10-CM

## 2019-03-13 NOTE — TELEPHONE ENCOUNTER
Please call pt to schedule lab for CK and hepatic panel in 6 and 12 weeks to monitor. Pt notified via [facility]

## 2019-03-25 ENCOUNTER — PATIENT MESSAGE (OUTPATIENT)
Dept: ENDOCRINOLOGY | Facility: CLINIC | Age: 29
End: 2019-03-25

## 2019-03-26 NOTE — TELEPHONE ENCOUNTER
I, Paty Owen MD, am the physician of Jacey Sosa  YOB: 1990, with whom I have a doctor/patient relationship and with whom I have treated since 08/2017.     Jacey Sosa has undergone appropriate and successful clinical treatment for gender transition to the gender of male. As these changes from hormonal therapy are irreversible, they should be legally recognized as male.     I declare under penalty of perjury under the laws of the [place] that the foregoing is true and correct.    If there are any questions or concerns please contact my office,     Payt Owen MD           [place] #[id number]            Chair of Endocrinology, [facility]          Associate Clerkship Director and Senior Lecturer, [facility]   [employer], [employer] [facility], [address] [place] [address], 9th floor clinic                                   ([id number]), [place] [address]          O: [phone]  F: [phone]            Email: [email or web address]

## 2019-03-29 ENCOUNTER — TELEPHONE (OUTPATIENT)
Dept: UROGYNECOLOGY | Facility: CLINIC | Age: 29
End: 2019-03-29

## 2019-03-29 NOTE — TELEPHONE ENCOUNTER
----- Message from Wilber Cruz sent at 3/29/2019  9:45 AM CDT -----  PLEASE CALL PT SHE NEEDS TO RESCHEDULE HER APPT FOR HER ANNUAL AND A IUD REMOVAL NOTHING AVAILABLE THAT I CAN SCHEDULE [phone] THANKS

## 2019-03-29 NOTE — TELEPHONE ENCOUNTER
Attempted to contact pt to reschedule his appointment on 4/10. Pt did not answer. LM to call office.

## 2019-04-03 ENCOUNTER — LAB VISIT (OUTPATIENT)
Dept: LAB | Facility: HOSPITAL | Age: 29
End: 2019-04-03
Attending: INTERNAL MEDICINE
Payer: OTHER GOVERNMENT

## 2019-04-03 DIAGNOSIS — E03.9 ACQUIRED HYPOTHYROIDISM: ICD-10-CM

## 2019-04-03 DIAGNOSIS — E55.9 VITAMIN D DEFICIENCY: ICD-10-CM

## 2019-04-03 LAB — TSH SERPL DL<=0.005 MIU/L-ACNC: 1.2 UIU/ML (ref 0.34–5.6)

## 2019-04-03 PROCEDURE — 84443 ASSAY THYROID STIM HORMONE: CPT

## 2019-04-03 PROCEDURE — 36415 COLL VENOUS BLD VENIPUNCTURE: CPT

## 2019-04-17 ENCOUNTER — HOSPITAL ENCOUNTER (OUTPATIENT)
Dept: RADIOLOGY | Facility: HOSPITAL | Age: 29
Discharge: HOME | End: 2019-04-17
Attending: OBSTETRICS & GYNECOLOGY
Payer: OTHER GOVERNMENT

## 2019-04-17 ENCOUNTER — LAB VISIT (OUTPATIENT)
Dept: LAB | Facility: OTHER | Age: 29
End: 2019-04-17
Attending: OBSTETRICS & GYNECOLOGY
Payer: OTHER GOVERNMENT

## 2019-04-17 ENCOUNTER — OFFICE VISIT (OUTPATIENT)
Dept: UROGYNECOLOGY | Facility: CLINIC | Age: 29
End: 2019-04-17
Payer: OTHER GOVERNMENT

## 2019-04-17 VITALS
WEIGHT: 177.5 LBS | SYSTOLIC BLOOD PRESSURE: 116 MMHG | BODY MASS INDEX: 30.3 KG/M2 | HEIGHT: 64 IN | DIASTOLIC BLOOD PRESSURE: 74 MMHG

## 2019-04-17 DIAGNOSIS — Z11.3 ROUTINE SCREENING FOR STI (SEXUALLY TRANSMITTED INFECTION): Primary | ICD-10-CM

## 2019-04-17 DIAGNOSIS — Z79.890 HORMONE REPLACEMENT THERAPY: ICD-10-CM

## 2019-04-17 DIAGNOSIS — A59.9 TRICHOMONAS VAGINALIS INFECTION: ICD-10-CM

## 2019-04-17 DIAGNOSIS — Z12.4 CERVICAL CANCER SCREENING: ICD-10-CM

## 2019-04-17 PROCEDURE — 99204 OFFICE O/P NEW MOD 45 MIN: CPT | Mod: S$PBB,,, | Performed by: OBSTETRICS & GYNECOLOGY

## 2019-04-17 PROCEDURE — 99999 PR PBB SHADOW E&M-EST. PATIENT-LVL III: ICD-10-PCS | Mod: PBBFAC,,, | Performed by: OBSTETRICS & GYNECOLOGY

## 2019-04-17 PROCEDURE — 76830 US PELVIS COMP WITH TRANSVAG NON-OB (XPD): ICD-10-PCS | Mod: 26,,, | Performed by: RADIOLOGY

## 2019-04-17 PROCEDURE — 76856 US EXAM PELVIC COMPLETE: CPT | Mod: 26,,, | Performed by: RADIOLOGY

## 2019-04-17 PROCEDURE — 99213 OFFICE O/P EST LOW 20 MIN: CPT | Mod: PBBFAC,25 | Performed by: OBSTETRICS & GYNECOLOGY

## 2019-04-17 PROCEDURE — 86592 SYPHILIS TEST NON-TREP QUAL: CPT

## 2019-04-17 PROCEDURE — 99204 PR OFFICE/OUTPT VISIT, NEW, LEVL IV, 45-59 MIN: ICD-10-PCS | Mod: S$PBB,,, | Performed by: OBSTETRICS & GYNECOLOGY

## 2019-04-17 PROCEDURE — 88142 CYTOPATH C/V THIN LAYER: CPT

## 2019-04-17 PROCEDURE — 87624 HPV HI-RISK TYP POOLED RSLT: CPT

## 2019-04-17 PROCEDURE — 80074 ACUTE HEPATITIS PANEL: CPT

## 2019-04-17 PROCEDURE — 99999 PR PBB SHADOW E&M-EST. PATIENT-LVL III: CPT | Mod: PBBFAC,,, | Performed by: OBSTETRICS & GYNECOLOGY

## 2019-04-17 PROCEDURE — 86703 HIV-1/HIV-2 1 RESULT ANTBDY: CPT

## 2019-04-17 PROCEDURE — 76856 US PELVIS COMP WITH TRANSVAG NON-OB (XPD): ICD-10-PCS | Mod: 26,,, | Performed by: RADIOLOGY

## 2019-04-17 PROCEDURE — 76830 TRANSVAGINAL US NON-OB: CPT | Mod: TC,PN

## 2019-04-17 PROCEDURE — 87491 CHLMYD TRACH DNA AMP PROBE: CPT

## 2019-04-17 PROCEDURE — 76830 TRANSVAGINAL US NON-OB: CPT | Mod: 26,,, | Performed by: RADIOLOGY

## 2019-04-17 PROCEDURE — 36415 COLL VENOUS BLD VENIPUNCTURE: CPT

## 2019-04-17 PROCEDURE — 87210 SMEAR WET MOUNT SALINE/INK: CPT | Mod: PBBFAC | Performed by: OBSTETRICS & GYNECOLOGY

## 2019-04-17 RX ORDER — METRONIDAZOLE 500 MG/1
500 TABLET ORAL EVERY 12 HOURS
Qty: 14 TABLET | Refills: 0 | Status: SHIPPED | OUTPATIENT
Start: 2019-04-17 | End: 2019-04-24

## 2019-04-17 NOTE — PATIENT INSTRUCTIONS
1)  Well-care:  --pap/HPV done today:  --STI screen: chlamydia/gonorrhea, wet prep collected today; HIV, RPR, hepatitis panel ordered  --reminded to do self breast exams monthly and exam technique reviewed  --pelvic US to define baseline anatomy on testosterone  --wet prep concerning for trichomonas: flagyl 500 BID x 7 days; let partner know to be screened/treated if needed before next sexual encounter  --use condoms to prevent infections  --not interested in bottom surgery  --if interested in fertility/egg harvesting, let us know    2)  H/o IUD, overdue for removal:  --removed today  --call if any major issues    3)  RTC 1 year. Will call you with test results.

## 2019-04-17 NOTE — PROGRESS NOTES
[facility]   [address] [address]    Jacey Sosa  [id number]  1990 April 17, 2019    Consulting Physician: Self, Aaareferral   GYN:  1 year ago  Primary M.D.: Primary Doctor No    Chief Complaint   Patient presents with    Gynecologic Exam     IUD removal       HPI:     With regards to his gender incongruence care:  He is in the        First identified as a man in early his early teens   Gender identity - male        Therapist at the time hormones were started:  Yes Therapy / counselor - Nicole Ingram, PhD [employer] - not currently seeing anyone   Letter was provided attesting to readiness for TRT       Gender Surgeries - none, but interested  Desired interventions:  Top surgery         Fertility concerns - may be interested but understands risks of trt      We started   Testosterone replacement therapy 8/3/17  Current dose  100 mg q 1 week         LMP: spotting q 3 months   Had a gyn eval in the past - last was end of July 2017   Has IUD--thinks exercise is irritating the device; has had IUD in place x 6 years (mirena)          Social:  Work -        Relationship, orientation -   currently with a woman. With men and women   Has had penile penetration.  No anal intercourse.           No dx of depression  Or anxiety      New concerns today:  None       With regards to the vitamin d deficiency:  Currently not taking otc 2000 iu a day     With regards to  The hypothyroidism:     generic lt4 100 mcg qd  - off for 1 month   Was found to have an elevated tsh on routine labs  tpo was negative      current symptoms:  None      No weight gain  No Fatigue   No Constipation        Does not drink  No excessive tylenol  No abdominal pain or recent illness     GYN: no vaginal dryness, d/c, itching/burning.  Last spotting 1 month ago.   : no UI, dysuria.    CRS:  No C/D, pain, blood.     Past Ob History  G0    Gynecologic History  LMP: No  LMP recorded. Patient has had an injection.  Age of menarche: 13 yo  Age of menopause: NA on T4  Menstrual history: spotting Q3 months on T4; h/o menorrhagia with P4 IUD--8 years old, needs removal  Pap test: 2018, normal per report.  STI screen 2018 normal per report.  History of abnormal paps: No.  History of STIs:  No  Mammogram: none  Colonoscopy: none  DEXA: none    Past Medical History  Past Medical History:   Diagnosis Date    Gender dysphoria in adult     Hypothyroidism         Past Surgical History  Past Surgical History:   Procedure Laterality Date    TYMPANOSTOMY TUBE PLACEMENT      WISDOM TOOTH EXTRACTION          Hysterectomy: No      Family History  Family History   Problem Relation Age of Onset    Endometrial cancer Mother     Diabetes Maternal Grandmother     Heart disease Maternal Grandmother     Hyperlipidemia Maternal Grandmother     Hypertension Maternal Grandmother     Diabetes Maternal Grandfather     Hyperlipidemia Maternal Grandfather     Heart disease Maternal Grandfather     Hypertension Maternal Grandfather     Cirrhosis Neg Hx       Colon CA: No  Breast CA: No  GYN CA: Yes - mother (endometrial CA, living)   CA: No    Social History  Social History     Tobacco Use   Smoking Status Former Smoker   Smokeless Tobacco Former User   Tobacco Comment    quit 2/2018     Cessation date: 2018.  PPD:  1 x 8 years.   Social History     Substance and Sexual Activity   Alcohol Use Yes    Comment: no alcohol since 11/2018   .    Social History     Substance and Sexual Activity   Drug Use No   .  The patient is in relationship.  Resides in [place] [address].  Employment status: currently employed in  as .      Allergies  Review of patient's allergies indicates:  No Known Allergies    Medications  Current Outpatient Medications on File Prior to Visit   Medication Sig Dispense Refill    cholecalciferol, vitamin D3, (VITAMIN D3) 2,000 unit Cap Take 1 capsule (2,000 Units  "total) by mouth once daily.      levothyroxine (SYNTHROID) 50 MCG tablet Take 1 tablet (50 mcg total) by mouth once daily. 30 tablet 11    multivitamin (ONE DAILY MULTIVITAMIN) per tablet Take 1 tablet by mouth once daily. Has 800 units of Vitamin D      testosterone cypionate (DEPOTESTOTERONE CYPIONATE) 200 mg/mL injection Inject 0.5 mLs (100 mg total) into the muscle every 7 days. 3 ml syringe with 18 g needle  to draw  X 10   25 g 1 inch needle to inject x 10 10 mL 5     No current facility-administered medications on file prior to visit.        Review of Systems A 14 point ROS was reviewed with pertinent positives as noted above in the history of present illness.      Constitutional: negative  Eyes: negative  Endocrine: negative  Gastrointestinal: negative  Cardiovascular: negative  Respiratory: negative  Allergic/Immunologic: negative  Integumentary: negative  Psychiatric: negative  Musculoskeletal: negative   Ear/Nose/Throat: negative  Neurologic: negative  Genitourinary: SEE HPI  Hematologic/Lymphatic: negative   Breast: negative    Urogynecologic Exam  /74 (BP Location: Right arm, Patient Position: Sitting, BP Method: Medium (Manual))   Ht 5' 4" (1.626 m)   Wt 80.5 kg (177 lb 7.5 oz)   BMI 30.46 kg/m²     GENERAL APPEARANCE:  The patient is well-developed, well-nourished.  Neck:  Supple with no thyromegaly, no carotid bruits.  Heart:  Regular rate and rhythm, no murmurs, rubs or gallops.  Lungs:  Clear.  No CVA tenderness.  Abdomen:  Soft, nontender, nondistended, no hepatosplenomegaly.  Incisions:  none    PELVIC:    External genitalia:  Normal Bartholins, Skenes and labia bilaterally.  +mild clitoromegaly consistent with testosterone Tx.   Urethra:  No caruncle, diverticulum or masses.  (--) hypermobility.    Vagina:  Atrophy (+) mild , no bladder masses or tender, + scant yellow discharge.    Cervix:  normal appearance; gen probe, pap, and wet prep collected; IUD strings visible; using ring " forceps, the IUD was gently removed from the uterine body without issue; the patient tolerated this well   Uterus: normal size, contour, position, consistency, mobility, non-tender  Adnexa: Not palpable.    POP-Q:    Deferred.  No obvious POP present with valsalva.     NEUROLOGIC:  Cranial nerves 2 through 12 intact.  Strength 5/5.  DTRs 2+ lower extremities.  S2 through 4 normal.  Sacral reflexes intact.    EXT: ULLOA, 2+ pulses bilaterally, no C/C/E    COUGH STRESS TEST:  negative  KEGEL: 1 /5    RECTAL:    External:  Normal, (--) hemorrhoids, (--) dovetailing.   Internal:   deferred    Wet prep: normal lactobacilli, scant atrophic epithelial cells, +trichomonas, neg clue cells    Impression    1. Routine screening for STI (sexually transmitted infection)    2. Cervical cancer screening    3. Hormone replacement therapy    4. Trichomonas vaginalis infection        Initial Plan  The patient was counseled regarding these issues. The patient was given a summary sheet containing each of these issues with possible options for evaluation and management. When appropriate, we also reviewed computer-generated diagrams specific to their diagnoses..  All questions were addressed to the patient's satisfaction.  1)  Well-care:  --pap/HPV done today:  --STI screen: chlamydia/gonorrhea, wet prep collected today; HIV, RPR, hepatitis panel ordered  --reminded to do self breast exams monthly and exam technique reviewed  --pelvic US to define baseline anatomy on testosterone  --wet prep concerning for trichomonas: flagyl 500 BID x 7 days; let partner know to be screened/treated if needed before next sexual encounter  --use condoms to prevent infections  --not interested in bottom surgery  --if interested in fertility/egg harvesting, let us know    2)  H/o IUD, overdue for removal:  --removed today  --call if any major issues    3)  RTC 1 year. Will call you with test results.     Approximately 45 min were spent in consult, 90 % in  discussion.     Thank you for requesting consultation of your patient.  I look forward to participating in their care.    Ayleen Mckoy  Female Pelvic Medicine and Reconstructive Surgery  [facility] [place], [place]

## 2019-04-18 ENCOUNTER — TELEPHONE (OUTPATIENT)
Dept: UROGYNECOLOGY | Facility: CLINIC | Age: 29
End: 2019-04-18

## 2019-04-18 LAB
C TRACH DNA SPEC QL NAA+PROBE: NOT DETECTED
HAV IGM SERPL QL IA: NEGATIVE
HBV CORE IGM SERPL QL IA: NEGATIVE
HBV SURFACE AG SERPL QL IA: NEGATIVE
HCV AB SERPL QL IA: NEGATIVE
HIV 1+2 AB+HIV1 P24 AG SERPL QL IA: NEGATIVE
N GONORRHOEA DNA SPEC QL NAA+PROBE: NOT DETECTED
RPR SER QL: NORMAL

## 2019-04-18 NOTE — TELEPHONE ENCOUNTER
----- Message from Ayleen Mckoy MD sent at 4/17/2019  9:26 PM CDT -----  Please let Michael know his pelvic US was normal. We will let him know other test results as they come back. Thanks!

## 2019-04-18 NOTE — TELEPHONE ENCOUNTER
Spoke to pt, he was informed that his US was normal and we will contact him with his other results. Pt aware and verbalizes understanding

## 2019-04-23 LAB
HPV HR 12 DNA CVX QL NAA+PROBE: NEGATIVE
HPV16 AG SPEC QL: POSITIVE
HPV18 DNA SPEC QL NAA+PROBE: NEGATIVE

## 2019-04-24 ENCOUNTER — TELEPHONE (OUTPATIENT)
Dept: UROGYNECOLOGY | Facility: CLINIC | Age: 29
End: 2019-04-24

## 2019-04-24 ENCOUNTER — LAB VISIT (OUTPATIENT)
Dept: LAB | Facility: HOSPITAL | Age: 29
End: 2019-04-24
Attending: NURSE PRACTITIONER
Payer: OTHER GOVERNMENT

## 2019-04-24 DIAGNOSIS — R74.8 ELEVATED LIVER ENZYMES: ICD-10-CM

## 2019-04-24 DIAGNOSIS — Z23 NEED FOR HPV VACCINE: Primary | ICD-10-CM

## 2019-04-24 LAB
ALBUMIN SERPL BCP-MCNC: 4.3 G/DL (ref 3.5–5.2)
ALP SERPL-CCNC: 63 U/L (ref 55–135)
ALT SERPL W/O P-5'-P-CCNC: 49 U/L (ref 10–44)
AST SERPL-CCNC: 43 U/L (ref 10–40)
BILIRUB DIRECT SERPL-MCNC: 0.1 MG/DL (ref 0.1–0.3)
BILIRUB SERPL-MCNC: 1 MG/DL (ref 0.1–1)
CK SERPL-CCNC: 302 U/L (ref 20–180)
PROT SERPL-MCNC: 7.4 G/DL (ref 6–8.4)

## 2019-04-24 PROCEDURE — 80076 HEPATIC FUNCTION PANEL: CPT

## 2019-04-24 PROCEDURE — 82550 ASSAY OF CK (CPK): CPT

## 2019-04-24 PROCEDURE — 36415 COLL VENOUS BLD VENIPUNCTURE: CPT

## 2019-04-25 NOTE — TELEPHONE ENCOUNTER
Gardasil ordered for patient.  Can you please help get patient set up to get these injections (3 shots).  Thanks!

## 2019-04-26 ENCOUNTER — TELEPHONE (OUTPATIENT)
Dept: UROGYNECOLOGY | Facility: CLINIC | Age: 29
End: 2019-04-26

## 2019-04-26 NOTE — TELEPHONE ENCOUNTER
Gardasil ordered for patient.  Can you please help get patient set up to get these injections (3 shots).  Thanks!    Please let patient know to call [phone] and aske to schedule injection appointment for gardasil with injection nurse: Bhavna (Je[phone], ext: [phone]).  They will help make appointment and get injection authorized. Thanks!

## 2019-04-29 NOTE — TELEPHONE ENCOUNTER
Spoke to pt regarding scheduling gradasil injections. Michael was informed that someone from our office will contact him to schedule. Pt is aware and verbalizes understanding.

## 2019-04-29 NOTE — TELEPHONE ENCOUNTER
Spoke to pt regarding scheduling gradasil injections. Michael was informed that someone from our office will contact him to schedule. Pt is aware and verbalizes understanding

## 2019-05-03 ENCOUNTER — CLINICAL SUPPORT (OUTPATIENT)
Dept: OBSTETRICS AND GYNECOLOGY | Facility: CLINIC | Age: 29
End: 2019-05-03
Payer: OTHER GOVERNMENT

## 2019-05-03 PROCEDURE — 99999 PR PBB SHADOW E&M-EST. PATIENT-LVL II: ICD-10-PCS | Mod: PBBFAC,,,

## 2019-05-03 PROCEDURE — 99212 OFFICE O/P EST SF 10 MIN: CPT | Mod: PBBFAC,25

## 2019-05-03 PROCEDURE — 99999 PR PBB SHADOW E&M-EST. PATIENT-LVL II: CPT | Mod: PBBFAC,,,

## 2019-05-03 PROCEDURE — 90471 IMMUNIZATION ADMIN: CPT | Mod: PBBFAC

## 2019-05-03 NOTE — PROGRESS NOTES
Here for Gardasil # 1 injection, without complaint at this time. Reports no pain before or after injection. Advised to wait in lobby 15 minutes after injection and report any adverse reactions. Return to clinic as scheduled for next injection.         Site: [facility]

## 2019-05-16 ENCOUNTER — PATIENT MESSAGE (OUTPATIENT)
Dept: ENDOCRINOLOGY | Facility: CLINIC | Age: 29
End: 2019-05-16

## 2019-05-20 ENCOUNTER — PATIENT MESSAGE (OUTPATIENT)
Dept: ENDOCRINOLOGY | Facility: CLINIC | Age: 29
End: 2019-05-20

## 2019-06-07 ENCOUNTER — CLINICAL SUPPORT (OUTPATIENT)
Dept: OBSTETRICS AND GYNECOLOGY | Facility: CLINIC | Age: 29
End: 2019-06-07
Payer: OTHER GOVERNMENT

## 2019-06-07 ENCOUNTER — LAB VISIT (OUTPATIENT)
Dept: LAB | Facility: HOSPITAL | Age: 29
End: 2019-06-07
Attending: INTERNAL MEDICINE
Payer: OTHER GOVERNMENT

## 2019-06-07 ENCOUNTER — PATIENT MESSAGE (OUTPATIENT)
Dept: HEPATOLOGY | Facility: CLINIC | Age: 29
End: 2019-06-07

## 2019-06-07 DIAGNOSIS — E03.9 ACQUIRED HYPOTHYROIDISM: ICD-10-CM

## 2019-06-07 DIAGNOSIS — R74.8 ELEVATED CK: ICD-10-CM

## 2019-06-07 DIAGNOSIS — R74.8 ELEVATED LIVER ENZYMES: Primary | ICD-10-CM

## 2019-06-07 DIAGNOSIS — R74.8 ELEVATED LIVER ENZYMES: ICD-10-CM

## 2019-06-07 DIAGNOSIS — E55.9 VITAMIN D DEFICIENCY: ICD-10-CM

## 2019-06-07 LAB
25(OH)D3+25(OH)D2 SERPL-MCNC: 44 NG/ML (ref 30–96)
ALBUMIN SERPL BCP-MCNC: 4.2 G/DL (ref 3.5–5.2)
ALBUMIN SERPL BCP-MCNC: 4.2 G/DL (ref 3.5–5.2)
ALP SERPL-CCNC: 63 U/L (ref 55–135)
ALP SERPL-CCNC: 63 U/L (ref 55–135)
ALT SERPL W/O P-5'-P-CCNC: 59 U/L (ref 10–44)
ALT SERPL W/O P-5'-P-CCNC: 59 U/L (ref 10–44)
ANION GAP SERPL CALC-SCNC: 9 MMOL/L (ref 8–16)
AST SERPL-CCNC: 49 U/L (ref 10–40)
AST SERPL-CCNC: 49 U/L (ref 10–40)
BASOPHILS # BLD AUTO: 0.04 K/UL (ref 0–0.2)
BASOPHILS NFR BLD: 0.7 % (ref 0–1.9)
BILIRUB DIRECT SERPL-MCNC: 0.2 MG/DL (ref 0.1–0.3)
BILIRUB SERPL-MCNC: 1.2 MG/DL (ref 0.1–1)
BILIRUB SERPL-MCNC: 1.2 MG/DL (ref 0.1–1)
BUN SERPL-MCNC: 11 MG/DL (ref 6–20)
CALCIUM SERPL-MCNC: 9.6 MG/DL (ref 8.7–10.5)
CHLORIDE SERPL-SCNC: 99 MMOL/L (ref 95–110)
CHOLEST SERPL-MCNC: 198 MG/DL (ref 120–199)
CHOLEST/HDLC SERPL: 4.6 {RATIO} (ref 2–5)
CK SERPL-CCNC: 777 U/L (ref 20–180)
CO2 SERPL-SCNC: 26 MMOL/L (ref 23–29)
CREAT SERPL-MCNC: 0.8 MG/DL (ref 0.5–1.4)
DIFFERENTIAL METHOD: ABNORMAL
EOSINOPHIL # BLD AUTO: 0.1 K/UL (ref 0–0.5)
EOSINOPHIL NFR BLD: 0.8 % (ref 0–8)
ERYTHROCYTE [DISTWIDTH] IN BLOOD BY AUTOMATED COUNT: 13 % (ref 11.5–14.5)
EST. GFR  (AFRICAN AMERICAN): >60 ML/MIN/1.73 M^2
EST. GFR  (NON AFRICAN AMERICAN): >60 ML/MIN/1.73 M^2
ESTIMATED AVG GLUCOSE: 91 MG/DL (ref 68–131)
GLUCOSE SERPL-MCNC: 93 MG/DL (ref 70–110)
HBA1C MFR BLD HPLC: 4.8 % (ref 4.5–6.2)
HCT VFR BLD AUTO: 50.2 % (ref 37–48.5)
HDLC SERPL-MCNC: 43 MG/DL (ref 40–75)
HDLC SERPL: 21.7 % (ref 20–50)
HGB BLD-MCNC: 16.7 G/DL (ref 12–16)
IMM GRANULOCYTES # BLD AUTO: 0.02 K/UL (ref 0–0.04)
IMM GRANULOCYTES NFR BLD AUTO: 0.3 % (ref 0–0.5)
LDLC SERPL CALC-MCNC: 133.6 MG/DL (ref 63–159)
LYMPHOCYTES # BLD AUTO: 2.6 K/UL (ref 1–4.8)
LYMPHOCYTES NFR BLD: 41.7 % (ref 18–48)
MCH RBC QN AUTO: 31.8 PG (ref 27–31)
MCHC RBC AUTO-ENTMCNC: 33.3 G/DL (ref 32–36)
MCV RBC AUTO: 96 FL (ref 82–98)
MONOCYTES # BLD AUTO: 0.4 K/UL (ref 0.3–1)
MONOCYTES NFR BLD: 6.4 % (ref 4–15)
NEUTROPHILS # BLD AUTO: 3.1 K/UL (ref 1.8–7.7)
NEUTROPHILS NFR BLD: 50.1 % (ref 38–73)
NONHDLC SERPL-MCNC: 155 MG/DL
NRBC BLD-RTO: 0 /100 WBC
PLATELET # BLD AUTO: 218 K/UL (ref 150–350)
PMV BLD AUTO: 10.3 FL (ref 9.2–12.9)
POTASSIUM SERPL-SCNC: 4.1 MMOL/L (ref 3.5–5.1)
PROT SERPL-MCNC: 7.7 G/DL (ref 6–8.4)
PROT SERPL-MCNC: 7.7 G/DL (ref 6–8.4)
RBC # BLD AUTO: 5.25 M/UL (ref 4–5.4)
SODIUM SERPL-SCNC: 134 MMOL/L (ref 136–145)
TRIGL SERPL-MCNC: 107 MG/DL (ref 30–150)
TSH SERPL DL<=0.005 MIU/L-ACNC: 2.54 UIU/ML (ref 0.34–5.6)
WBC # BLD AUTO: 6.11 K/UL (ref 3.9–12.7)

## 2019-06-07 PROCEDURE — 85025 COMPLETE CBC W/AUTO DIFF WBC: CPT

## 2019-06-07 PROCEDURE — 99999 PR PBB SHADOW E&M-EST. PATIENT-LVL I: ICD-10-PCS | Mod: PBBFAC,,,

## 2019-06-07 PROCEDURE — 99211 OFF/OP EST MAY X REQ PHY/QHP: CPT | Mod: PBBFAC,25

## 2019-06-07 PROCEDURE — 90471 IMMUNIZATION ADMIN: CPT | Mod: PBBFAC

## 2019-06-07 PROCEDURE — 80053 COMPREHEN METABOLIC PANEL: CPT

## 2019-06-07 PROCEDURE — 36415 COLL VENOUS BLD VENIPUNCTURE: CPT

## 2019-06-07 PROCEDURE — 84443 ASSAY THYROID STIM HORMONE: CPT

## 2019-06-07 PROCEDURE — 83036 HEMOGLOBIN GLYCOSYLATED A1C: CPT

## 2019-06-07 PROCEDURE — 82306 VITAMIN D 25 HYDROXY: CPT

## 2019-06-07 PROCEDURE — 82550 ASSAY OF CK (CPK): CPT

## 2019-06-07 PROCEDURE — 80061 LIPID PANEL: CPT

## 2019-06-07 PROCEDURE — 99999 PR PBB SHADOW E&M-EST. PATIENT-LVL I: CPT | Mod: PBBFAC,,,

## 2019-06-07 PROCEDURE — 80076 HEPATIC FUNCTION PANEL: CPT

## 2019-06-07 NOTE — PROGRESS NOTES
Here for Gardasil # 2  injection, without complaint at this time. Reports no pain before or after injection. Advised to wait in lobby 15 minutes after injection and report any adverse reactions. Return to clinic 11/2019 for next injection.       Site: [facility]

## 2019-07-08 DIAGNOSIS — Z23 NEED FOR HPV VACCINE: Primary | ICD-10-CM

## 2019-09-17 ENCOUNTER — PATIENT MESSAGE (OUTPATIENT)
Dept: ENDOCRINOLOGY | Facility: CLINIC | Age: 29
End: 2019-09-17

## 2019-09-17 DIAGNOSIS — F64.0 GENDER DYSPHORIA IN ADULT: Primary | ICD-10-CM

## 2019-09-17 RX ORDER — TESTOSTERONE CYPIONATE 200 MG/ML
200 INJECTION, SOLUTION INTRAMUSCULAR
Qty: 4 ML | Refills: 0 | Status: SHIPPED | OUTPATIENT
Start: 2019-09-17 | End: 2019-10-15 | Stop reason: SDUPTHER

## 2019-10-15 DIAGNOSIS — F64.0 GENDER DYSPHORIA IN ADULT: ICD-10-CM

## 2019-10-15 RX ORDER — TESTOSTERONE CYPIONATE 200 MG/ML
200 INJECTION, SOLUTION INTRAMUSCULAR
Qty: 4 ML | Refills: 4 | Status: SHIPPED | OUTPATIENT
Start: 2019-10-15 | End: 2020-06-19 | Stop reason: SDUPTHER

## 2019-11-08 ENCOUNTER — CLINICAL SUPPORT (OUTPATIENT)
Dept: OBSTETRICS AND GYNECOLOGY | Facility: CLINIC | Age: 29
End: 2019-11-08
Payer: OTHER GOVERNMENT

## 2019-11-08 DIAGNOSIS — Z23 NEED FOR HPV VACCINE: Primary | ICD-10-CM

## 2019-11-08 PROCEDURE — 99999 PR PBB SHADOW E&M-EST. PATIENT-LVL I: ICD-10-PCS | Mod: PBBFAC,,,

## 2019-11-08 PROCEDURE — 90471 IMMUNIZATION ADMIN: CPT | Mod: PBBFAC

## 2019-11-08 PROCEDURE — 99211 OFF/OP EST MAY X REQ PHY/QHP: CPT | Mod: PBBFAC

## 2019-11-08 PROCEDURE — 99999 PR PBB SHADOW E&M-EST. PATIENT-LVL I: CPT | Mod: PBBFAC,,,

## 2019-11-08 NOTE — PROGRESS NOTES
Here for Gardasil # 3 injection, without complaint at this time. Reports no pain before or after injection. Advised to wait in lobby 15 minutes after injection and report any adverse reactions. Patient has completed 3 series injection.      Site: RD

## 2020-03-13 ENCOUNTER — OFFICE VISIT (OUTPATIENT)
Dept: UROGYNECOLOGY | Facility: CLINIC | Age: 30
End: 2020-03-13
Payer: OTHER GOVERNMENT

## 2020-03-13 VITALS
SYSTOLIC BLOOD PRESSURE: 133 MMHG | DIASTOLIC BLOOD PRESSURE: 83 MMHG | HEIGHT: 64 IN | BODY MASS INDEX: 30.38 KG/M2 | WEIGHT: 177.94 LBS

## 2020-03-13 DIAGNOSIS — Z12.4 CERVICAL CANCER SCREENING: ICD-10-CM

## 2020-03-13 DIAGNOSIS — Z11.3 SCREEN FOR STD (SEXUALLY TRANSMITTED DISEASE): Primary | ICD-10-CM

## 2020-03-13 DIAGNOSIS — E55.9 VITAMIN D DEFICIENCY: ICD-10-CM

## 2020-03-13 PROCEDURE — 88175 CYTOPATH C/V AUTO FLUID REDO: CPT

## 2020-03-13 PROCEDURE — 87591 N.GONORRHOEAE DNA AMP PROB: CPT

## 2020-03-13 PROCEDURE — 99213 OFFICE O/P EST LOW 20 MIN: CPT | Mod: PBBFAC | Performed by: OBSTETRICS & GYNECOLOGY

## 2020-03-13 PROCEDURE — 99999 PR PBB SHADOW E&M-EST. PATIENT-LVL III: ICD-10-PCS | Mod: PBBFAC,,, | Performed by: OBSTETRICS & GYNECOLOGY

## 2020-03-13 PROCEDURE — 87481 CANDIDA DNA AMP PROBE: CPT | Mod: 59

## 2020-03-13 PROCEDURE — 99214 OFFICE O/P EST MOD 30 MIN: CPT | Mod: S$PBB,,, | Performed by: OBSTETRICS & GYNECOLOGY

## 2020-03-13 PROCEDURE — 99999 PR PBB SHADOW E&M-EST. PATIENT-LVL III: CPT | Mod: PBBFAC,,, | Performed by: OBSTETRICS & GYNECOLOGY

## 2020-03-13 PROCEDURE — 87624 HPV HI-RISK TYP POOLED RSLT: CPT

## 2020-03-13 PROCEDURE — 99214 PR OFFICE/OUTPT VISIT, EST, LEVL IV, 30-39 MIN: ICD-10-PCS | Mod: S$PBB,,, | Performed by: OBSTETRICS & GYNECOLOGY

## 2020-03-13 NOTE — PATIENT INSTRUCTIONS
1)  Well-care:  --4/2019 pap: normal/HPV+, repeat donetoday  --4/2019 STI screen NEG  --4/2019 pelvic US normal  --wet prep 4/2019: trichomonas: affirm done today    --use condoms to prevent infections  --not interested in bottom surgery  --if interested in fertility/egg harvesting, let us know    2)  H/o IUD:  --would like placement--will order  --last placement was difficult but better after took cystotec--give night before next placement    3)  Low vitamin D:  --please start taking    4)  Will call you to set up IUD appt.

## 2020-03-13 NOTE — PROGRESS NOTES
Urogyn follow up  03/15/2020    [facility] UROGYNECOLOGY-[id number]   [address] [address]    Isidro Sosa  [id number]  1990      Isidro Sosa is a 29 y.o. here for a urogyn follow up.    With regards to his gender incongruence care:  He is in the        First identified as a man in early his early teens   Gender identity - male        Therapist at the time hormones were started:  Yes Therapy / counselor - Nicole Ingram, PhD Kady KEENAN - not currently seeing anyone   Letter was provided attesting to readiness for TRT        Gender Surgeries - none, but interested  Desired interventions:  Top surgery         Fertility concerns - may be interested but understands risks of trt      We started   Testosterone replacement therapy 8/3/17  Current dose  100 mg q 1 week         LMP: spotting q 3 months   Had a gyn eval in the past - last was end of July 2017   Has IUD--thinks exercise is irritating the device; has had IUD in place x 6 years (mirena)          Social:  Work -        Relationship, orientation -   currently with a woman. With men and women   Has had penile penetration.  No anal intercourse.           No dx of depression  Or anxiety      New concerns today:  None       With regards to the vitamin d deficiency:  Currently not taking otc 2000 iu a day     With regards to  The hypothyroidism:     generic lt4 100 mcg qd  - off for 1 month   Was found to have an elevated tsh on routine labs  tpo was negative      current symptoms:  None      No weight gain  No Fatigue   No Constipation        Does not drink  No excessive tylenol  No abdominal pain or recent illness     GYN: no vaginal dryness, d/c, itching/burning.  Last spotting 1 month ago.   : no UI, dysuria.    CRS:  No C/D, pain, blood.     Past Ob History  G0    Gynecologic History  LMP: No LMP recorded. Patient has had an injection.  Age of menarche: 15 yo  Age of menopause: NA on T4  Menstrual  history: spotting Q3 months on T4; h/o menorrhagia with P4 IUD--8 years old, needs removal  Pap test: 2019, pap ilia/HPV+.  2019 chlam/abel, HIV, RPR, hepatitis: NEG.  History of abnormal paps: No.  History of STIs:  No  --pelvic US 4/2019: normal  --wet prep 4/2019: trichomonas  Mammogram: none  Colonoscopy: none  DEXA: none    Past Medical History  Past Medical History:   Diagnosis Date    Gender dysphoria in adult     Hypothyroidism         Past Surgical History  Past Surgical History:   Procedure Laterality Date    TYMPANOSTOMY TUBE PLACEMENT      WISDOM TOOTH EXTRACTION          Hysterectomy: No      Issues include:  Patient Active Problem List   Diagnosis    Endocrine disorder in female-to-male transgender person    Vitamin D deficiency    Acquired hypothyroidism    BMI 30.0-30.9,adult    Screen for STD (sexually transmitted disease)    Cervical cancer screening     History since last visit:     1)  Well-care:  --4/2019 pap: normal/HPV+, repeat today  --4/2019 STI screen NEG  --4/2019 pelvic US normal  --wet prep 4/2019: trichomonas: that partner was not treated and has new partner  --has noticed some yellow d/c x 2 days; no itching/burning  --no menses on T4    2)  H/o IUD:  --removed 2019  --last placement was difficult but better after took cystotec--give night before next placement    Medications:    Current Outpatient Medications:     cholecalciferol, vitamin D3, (VITAMIN D3) 2,000 unit Cap, Take 1 capsule (2,000 Units total) by mouth once daily., Disp: , Rfl:     multivitamin (ONE DAILY MULTIVITAMIN) per tablet, Take 1 tablet by mouth once daily. Has 800 units of Vitamin D, Disp: , Rfl:     levothyroxine (SYNTHROID) 50 MCG tablet, Take 1 tablet (50 mcg total) by mouth once daily., Disp: 30 tablet, Rfl: 11    testosterone cypionate (DEPOTESTOTERONE CYPIONATE) 200 mg/mL injection, Inject 1 mL (200 mg total) into the muscle every 7 days. Include 3 ml syringe with 18G needle to draw x 10 and 21  "G 1  1/2 inch needle to inject x 10  NEEDS VISIT FOR FURTHER REFILLS, Disp: 4 mL, Rfl: 4      ROS:  As per HPI.      Exam  /83 (BP Location: Left arm, Patient Position: Sitting, BP Method: Medium (Automatic))   Ht 5' 4" (1.626 m)   Wt 80.7 kg (177 lb 14.6 oz)   BMI 30.54 kg/m²   General: alert and oriented, no acute distress  Respiratory: normal respiratory effort  Abd: soft, non-tender, non-distended    Pelvic  Ext. Genitalia: normal external genitalia. Normal bartholins and skenes glands  Vagina: + min atrophy. Normal vaginal mucosa without lesions. No discharge noted.   Non-tender bladder base without palpable mass.  Cervix: no lesions  Uterus:  uterus is normal size, shape, consistency and nontender   Urethra: no masses or tenderness  Urethral meatus: no lesions, caruncle or prolapse.    Impression  1. Screen for STD (sexually transmitted disease)  Vaginosis Screen by DNA Probe    C. trachomatis/N. gonorrhoeae by AMP DNA [facility]; Cervix   2. Cervical cancer screening  Liquid-Based Pap Smear, Screening    HPV High Risk Genotypes, PCR   3. Endocrine disorder in female-to-male transgender person     4. Vitamin D deficiency       We reviewed the above issues and discussed options for short-term versus long-term management of her problems.   Plan:     1)  Well-care:  --4/2019 pap: normal/HPV+, repeat donetoday  --4/2019 STI screen NEG   --gen probe done today  --4/2019 pelvic US normal  --wet prep 4/2019: trichomonas: affirm done today    --use condoms to prevent infections  --not interested in bottom surgery  --if interested in fertility/egg harvesting, let us know    2)  H/o IUD:  --would like placement--will order  --last placement was difficult but better after took cystotec--give night before next placement    3)  Low vitamin D:  --please start taking    4)  Will call you to set up IUD appt.     40 minutes were spent in face to face time with this patient  90 % of this time was spent in counseling " and/or coordination of care     Ayleen Mckoy MD  [facility]  Division of Female Pelvic Medicine and Reconstructive Surgery  Department of Obstetrics & Gynecology

## 2020-03-15 PROBLEM — Z11.3 SCREEN FOR STD (SEXUALLY TRANSMITTED DISEASE): Status: ACTIVE | Noted: 2020-03-15

## 2020-03-15 PROBLEM — Z12.4 CERVICAL CANCER SCREENING: Status: ACTIVE | Noted: 2020-03-15

## 2020-03-16 ENCOUNTER — TELEPHONE (OUTPATIENT)
Dept: UROGYNECOLOGY | Facility: CLINIC | Age: 30
End: 2020-03-16

## 2020-03-16 DIAGNOSIS — Z30.430 ENCOUNTER FOR INSERTION OF MIRENA IUD: Primary | ICD-10-CM

## 2020-03-16 LAB
BACTERIAL VAGINOSIS DNA: NEGATIVE
C TRACH DNA SPEC QL NAA+PROBE: NOT DETECTED
CANDIDA GLABRATA DNA: NEGATIVE
CANDIDA KRUSEI DNA: NEGATIVE
CANDIDA RRNA VAG QL PROBE: NEGATIVE
N GONORRHOEA DNA SPEC QL NAA+PROBE: NOT DETECTED
T VAGINALIS RRNA GENITAL QL PROBE: NEGATIVE

## 2020-03-24 LAB
HPV HR 12 DNA SPEC QL NAA+PROBE: POSITIVE
HPV16 AG SPEC QL: NEGATIVE
HPV18 DNA SPEC QL NAA+PROBE: NEGATIVE

## 2020-04-05 LAB
FINAL PATHOLOGIC DIAGNOSIS: NORMAL
Lab: NORMAL

## 2020-04-09 ENCOUNTER — TELEPHONE (OUTPATIENT)
Dept: UROGYNECOLOGY | Facility: CLINIC | Age: 30
End: 2020-04-09

## 2020-04-09 NOTE — TELEPHONE ENCOUNTER
Spoke with patient.  Reviewed results:  1)  4/2019 HPV +.  Type 16. Pap normal.   2)  6/2019 HPV+. Other types (not 16).  Pap normal.   3)  Had gardasil 2019, last injection 11/2019.    4)  Has been sexually active in between paps, new partners, doesn't always wear condoms.     Reviewed need for colposcopy.  Reviewed need to use condoms with penetrative intercourse.  STI screen 4/2019 neg.  Reports had STI screen after new partners since then at [facility]SH.      Will set up colposcopy with NP on day I'm in clinic.

## 2020-06-08 ENCOUNTER — PATIENT MESSAGE (OUTPATIENT)
Dept: UROGYNECOLOGY | Facility: CLINIC | Age: 30
End: 2020-06-08

## 2020-06-08 ENCOUNTER — TELEPHONE (OUTPATIENT)
Dept: UROGYNECOLOGY | Facility: CLINIC | Age: 30
End: 2020-06-08

## 2020-06-08 DIAGNOSIS — B97.7 HPV (HUMAN PAPILLOMA VIRUS) INFECTION: Primary | ICD-10-CM

## 2020-06-08 NOTE — TELEPHONE ENCOUNTER
Attempted to reach patient to schedule colposcopy. VM left to call office.  Mo message sent. EMIR ThomasP-BC

## 2020-06-19 ENCOUNTER — OFFICE VISIT (OUTPATIENT)
Dept: ENDOCRINOLOGY | Facility: CLINIC | Age: 30
End: 2020-06-19
Attending: INTERNAL MEDICINE
Payer: OTHER GOVERNMENT

## 2020-06-19 ENCOUNTER — LAB VISIT (OUTPATIENT)
Dept: LAB | Facility: HOSPITAL | Age: 30
End: 2020-06-19
Attending: INTERNAL MEDICINE
Payer: OTHER GOVERNMENT

## 2020-06-19 VITALS
DIASTOLIC BLOOD PRESSURE: 86 MMHG | WEIGHT: 178.38 LBS | SYSTOLIC BLOOD PRESSURE: 108 MMHG | BODY MASS INDEX: 30.45 KG/M2 | HEART RATE: 68 BPM | HEIGHT: 64 IN

## 2020-06-19 DIAGNOSIS — F64.0 GENDER DYSPHORIA IN ADULT: ICD-10-CM

## 2020-06-19 DIAGNOSIS — E03.9 ACQUIRED HYPOTHYROIDISM: Primary | ICD-10-CM

## 2020-06-19 DIAGNOSIS — E03.9 ACQUIRED HYPOTHYROIDISM: ICD-10-CM

## 2020-06-19 DIAGNOSIS — E55.9 VITAMIN D DEFICIENCY: ICD-10-CM

## 2020-06-19 LAB
25(OH)D3+25(OH)D2 SERPL-MCNC: 22 NG/ML (ref 30–96)
ALBUMIN SERPL BCP-MCNC: 4.4 G/DL (ref 3.5–5.2)
ALP SERPL-CCNC: 70 U/L (ref 55–135)
ALT SERPL W/O P-5'-P-CCNC: 76 U/L (ref 10–44)
ANION GAP SERPL CALC-SCNC: 5 MMOL/L (ref 8–16)
AST SERPL-CCNC: 48 U/L (ref 10–40)
BILIRUB SERPL-MCNC: 1 MG/DL (ref 0.1–1)
BUN SERPL-MCNC: 11 MG/DL (ref 6–20)
CALCIUM SERPL-MCNC: 9.8 MG/DL (ref 8.7–10.5)
CHLORIDE SERPL-SCNC: 106 MMOL/L (ref 95–110)
CHOLEST SERPL-MCNC: 178 MG/DL (ref 120–199)
CHOLEST/HDLC SERPL: 4.9 {RATIO} (ref 2–5)
CO2 SERPL-SCNC: 28 MMOL/L (ref 23–29)
CREAT SERPL-MCNC: 1 MG/DL (ref 0.5–1.4)
ERYTHROCYTE [DISTWIDTH] IN BLOOD BY AUTOMATED COUNT: 11.6 % (ref 11.5–14.5)
EST. GFR  (AFRICAN AMERICAN): >60 ML/MIN/1.73 M^2
EST. GFR  (NON AFRICAN AMERICAN): >60 ML/MIN/1.73 M^2
ESTIMATED AVG GLUCOSE: 94 MG/DL (ref 68–131)
GLUCOSE SERPL-MCNC: 88 MG/DL (ref 70–110)
HBA1C MFR BLD HPLC: 4.9 % (ref 4–5.6)
HCT VFR BLD AUTO: 50.4 % (ref 37–48.5)
HDLC SERPL-MCNC: 36 MG/DL (ref 40–75)
HDLC SERPL: 20.2 % (ref 20–50)
HGB BLD-MCNC: 17.1 G/DL (ref 12–16)
LDLC SERPL CALC-MCNC: 115.6 MG/DL (ref 63–159)
MCH RBC QN AUTO: 32.8 PG (ref 27–31)
MCHC RBC AUTO-ENTMCNC: 33.9 G/DL (ref 32–36)
MCV RBC AUTO: 97 FL (ref 82–98)
NONHDLC SERPL-MCNC: 142 MG/DL
PLATELET # BLD AUTO: 233 K/UL (ref 150–350)
PMV BLD AUTO: 9.9 FL (ref 9.2–12.9)
POTASSIUM SERPL-SCNC: 4.4 MMOL/L (ref 3.5–5.1)
PROT SERPL-MCNC: 8.1 G/DL (ref 6–8.4)
RBC # BLD AUTO: 5.22 M/UL (ref 4–5.4)
SODIUM SERPL-SCNC: 139 MMOL/L (ref 136–145)
TRIGL SERPL-MCNC: 132 MG/DL (ref 30–150)
TSH SERPL DL<=0.005 MIU/L-ACNC: 2.08 UIU/ML (ref 0.4–4)
WBC # BLD AUTO: 5.3 K/UL (ref 3.9–12.7)

## 2020-06-19 PROCEDURE — 80053 COMPREHEN METABOLIC PANEL: CPT

## 2020-06-19 PROCEDURE — 99214 PR OFFICE/OUTPT VISIT, EST, LEVL IV, 30-39 MIN: ICD-10-PCS | Mod: S$PBB,KX,, | Performed by: INTERNAL MEDICINE

## 2020-06-19 PROCEDURE — 99213 OFFICE O/P EST LOW 20 MIN: CPT | Mod: PBBFAC | Performed by: INTERNAL MEDICINE

## 2020-06-19 PROCEDURE — 99999 PR PBB SHADOW E&M-EST. PATIENT-LVL III: ICD-10-PCS | Mod: PBBFAC,,, | Performed by: INTERNAL MEDICINE

## 2020-06-19 PROCEDURE — 36415 COLL VENOUS BLD VENIPUNCTURE: CPT

## 2020-06-19 PROCEDURE — 82306 VITAMIN D 25 HYDROXY: CPT

## 2020-06-19 PROCEDURE — 99999 PR PBB SHADOW E&M-EST. PATIENT-LVL III: CPT | Mod: PBBFAC,,, | Performed by: INTERNAL MEDICINE

## 2020-06-19 PROCEDURE — 83036 HEMOGLOBIN GLYCOSYLATED A1C: CPT

## 2020-06-19 PROCEDURE — 84443 ASSAY THYROID STIM HORMONE: CPT

## 2020-06-19 PROCEDURE — 99214 OFFICE O/P EST MOD 30 MIN: CPT | Mod: S$PBB,KX,, | Performed by: INTERNAL MEDICINE

## 2020-06-19 PROCEDURE — 85027 COMPLETE CBC AUTOMATED: CPT

## 2020-06-19 PROCEDURE — 80061 LIPID PANEL: CPT

## 2020-06-19 RX ORDER — LEVOTHYROXINE SODIUM 50 UG/1
50 TABLET ORAL DAILY
Qty: 90 TABLET | Refills: 3 | Status: SHIPPED | OUTPATIENT
Start: 2020-06-19 | End: 2021-09-21

## 2020-06-19 RX ORDER — TESTOSTERONE CYPIONATE 200 MG/ML
200 INJECTION, SOLUTION INTRAMUSCULAR
Qty: 12 ML | Refills: 5 | Status: SHIPPED | OUTPATIENT
Start: 2020-06-19 | End: 2021-06-19 | Stop reason: SDUPTHER

## 2020-06-19 NOTE — PROGRESS NOTES
Subjective:      Patient ID: Isidro Sosa is a 29 y.o. female.    Chief Complaint:  Gender Identity Disorder      History of Present Illness  With regards to his gender incongruence care:  He is in the   - reservist    working at [employer]   Did not get repeat labs done after last visit's abnormal labs       First identified as a man in early his early teens   Gender identity - male        Therapist at the time hormones were started:  Yes Therapy / counselor - Nicole Ingram, PhD Kady KEENAN - not currently seeing anyone   Letter was provided attesting to readiness for TRT       Gender Surgeries - none, but interested  Desired interventions:  Top surgery         Fertility concerns - may be interested but understands risks of trt      We started   Testosterone replacement therapy 8/3/17 - Thursdays   Current dose  100 mg q 1 week         LMP: spotting resolved   Planning to get an IUD   Had a gyn eval in the past - last was end of July 2017           Social:   Relationship, orientation -   currently with a woman. With men and women           No dx of depression  Or anxiety      New concerns today:  None       With regards to the vitamin d deficiency:  Currently not taking otc 2000 iu a day       With regards to  The hypothyroidism:     generic lt4 50 mcg qd    Was found to have an elevated tsh on routine labs  tpo was negative      current symptoms:  None      No weight gain  No Fatigue   No Constipation        Does not drink          Review of Systems   Constitutional: Negative for unexpected weight change.   Eyes: Negative for visual disturbance.   Respiratory: Negative for shortness of breath.    Cardiovascular: Negative for chest pain.   Gastrointestinal: Negative for abdominal pain.   Musculoskeletal: Negative for arthralgias.   Skin: Negative for wound.   Neurological: Negative for headaches.   Hematological: Does not bruise/bleed easily.   Psychiatric/Behavioral: Negative for sleep disturbance.        Objective:   Physical Exam  Vitals signs reviewed.   Neck:      Thyroid: No thyromegaly.   Cardiovascular:      Rate and Rhythm: Normal rate.   Pulmonary:      Effort: Pulmonary effort is normal.   Abdominal:      Palpations: Abdomen is soft.         Body mass index is 30.61 kg/m².    Lab Review:   Lab Results   Component Value Date    HGBA1C 4.8 06/07/2019     Lab Results   Component Value Date    CHOL 198 06/07/2019    HDL 43 06/07/2019    LDLCALC 133.6 06/07/2019    TRIG 107 06/07/2019    CHOLHDL 21.7 06/07/2019     Lab Results   Component Value Date     (L) 06/07/2019    K 4.1 06/07/2019    CL 99 06/07/2019    CO2 26 06/07/2019    GLU 93 06/07/2019    BUN 11 06/07/2019    CREATININE 0.8 06/07/2019    CALCIUM 9.6 06/07/2019    PROT 7.7 06/07/2019    PROT 7.7 06/07/2019    ALBUMIN 4.2 06/07/2019    ALBUMIN 4.2 06/07/2019    BILITOT 1.2 (H) 06/07/2019    BILITOT 1.2 (H) 06/07/2019    ALKPHOS 63 06/07/2019    ALKPHOS 63 06/07/2019    AST 49 (H) 06/07/2019    AST 49 (H) 06/07/2019    ALT 59 (H) 06/07/2019    ALT 59 (H) 06/07/2019    ANIONGAP 9 06/07/2019    ESTGFRAFRICA >60.0 06/07/2019    EGFRNONAA >60.0 06/07/2019    TSH 2.539 06/07/2019       Assessment and Plan     Endocrine disorder in female-to-male transgender person  Transman: gender incongruence   Reviewed therapy, side effects (both wanted and unwanted), possible adverse outcomes, expectations, compliance.  Reviewed limited data on fertility     Reviewed the need for contraception as testosterone is not a contraceptive if having vaginal sex with non-trans men       Will  Continue Testosterone replacement therapy 100 mg q 1 week    labs today   rtc 12 months    Noting  Nl hh/ for men is:     Hemoglobin 14.0-18.0 g/dL    Hematocrit 40.0-54.0 %        Healthy lifestyle stressed           Vitamin D deficiency  over the counter vitamin D 2000 iu a day          Acquired hypothyroidism  Check tsh   Goal is a normal TSH  Avoid exogenous hyperthyroidism  as this can accelerate bone loss and increase risk of CV complications.    Reviewed usual  times of thyroid hormone  Changes     BMI 30.0-30.9,adult    alerted as to the increased risk for t2DM  Stressed diet and exercise  Goal 5-7% weight loss    Periodic hba1c levels       Labs today

## 2020-06-19 NOTE — ASSESSMENT & PLAN NOTE
Check tsh   Goal is a normal TSH  Avoid exogenous hyperthyroidism as this can accelerate bone loss and increase risk of CV complications.    Reviewed usual  times of thyroid hormone  Changes

## 2020-06-19 NOTE — ASSESSMENT & PLAN NOTE
Transman: gender incongruence   Reviewed therapy, side effects (both wanted and unwanted), possible adverse outcomes, expectations, compliance.  Reviewed limited data on fertility     Reviewed the need for contraception as testosterone is not a contraceptive if having vaginal sex with non-trans men       Will  Continue Testosterone replacement therapy 100 mg q 1 week    labs today   rtc 12 months    Noting  Nl hh/ for men is:     Hemoglobin 14.0-18.0 g/dL    Hematocrit 40.0-54.0 %        Healthy lifestyle stressed

## 2020-06-23 ENCOUNTER — PATIENT MESSAGE (OUTPATIENT)
Dept: ENDOCRINOLOGY | Facility: CLINIC | Age: 30
End: 2020-06-23

## 2020-06-23 DIAGNOSIS — R74.8 ABNORMAL LIVER ENZYMES: ICD-10-CM

## 2020-06-23 DIAGNOSIS — E03.9 ACQUIRED HYPOTHYROIDISM: ICD-10-CM

## 2020-07-13 ENCOUNTER — TELEPHONE (OUTPATIENT)
Dept: HEPATOLOGY | Facility: CLINIC | Age: 30
End: 2020-07-13

## 2020-07-27 ENCOUNTER — TELEPHONE (OUTPATIENT)
Dept: HEPATOLOGY | Facility: CLINIC | Age: 30
End: 2020-07-27

## 2020-07-27 ENCOUNTER — OFFICE VISIT (OUTPATIENT)
Dept: HEPATOLOGY | Facility: CLINIC | Age: 30
End: 2020-07-27
Payer: OTHER GOVERNMENT

## 2020-07-27 VITALS
DIASTOLIC BLOOD PRESSURE: 67 MMHG | HEART RATE: 75 BPM | BODY MASS INDEX: 30.49 KG/M2 | WEIGHT: 183 LBS | RESPIRATION RATE: 18 BRPM | HEIGHT: 65 IN | OXYGEN SATURATION: 98 % | SYSTOLIC BLOOD PRESSURE: 135 MMHG

## 2020-07-27 DIAGNOSIS — R74.8 ABNORMAL LIVER ENZYMES: ICD-10-CM

## 2020-07-27 PROCEDURE — 99214 OFFICE O/P EST MOD 30 MIN: CPT | Mod: S$PBB,,, | Performed by: INTERNAL MEDICINE

## 2020-07-27 PROCEDURE — 99999 PR PBB SHADOW E&M-EST. PATIENT-LVL IV: CPT | Mod: PBBFAC,,, | Performed by: INTERNAL MEDICINE

## 2020-07-27 PROCEDURE — 99999 PR PBB SHADOW E&M-EST. PATIENT-LVL IV: ICD-10-PCS | Mod: PBBFAC,,, | Performed by: INTERNAL MEDICINE

## 2020-07-27 PROCEDURE — 99214 OFFICE O/P EST MOD 30 MIN: CPT | Mod: PBBFAC | Performed by: INTERNAL MEDICINE

## 2020-07-27 PROCEDURE — 99214 PR OFFICE/OUTPT VISIT, EST, LEVL IV, 30-39 MIN: ICD-10-PCS | Mod: S$PBB,,, | Performed by: INTERNAL MEDICINE

## 2020-07-27 NOTE — PATIENT INSTRUCTIONS
PLAN:  1. Labs every 3 months: liver profiile  2. Liver biopsy now.   3. wILL REVIEW BIOPSY IN PATH CONF

## 2020-07-27 NOTE — LETTER
July 27, 2020      Paty Owen MD  [address]  [place] [address]           [facility] - Hepatology  [address]  [place] [address]  Phone: [phone]  Fax: [phone]          Patient: Isidro Sosa   MR Number: [id number]   YOB: 1990   Date of Visit: 7/27/2020       Dear Dr. Paty Owen:    Thank you for referring Isidro Sosa to me for evaluation. Attached you will find relevant portions of my assessment and plan of care.    If you have questions, please do not hesitate to call me. I look forward to following Isidro Sosa along with you.    Sincerely,    Valeri Ervin MD    Enclosure  CC:  No Recipients    If you would like to receive this communication electronically, please contact [email or web address] or [phone] to request more information on [employer] access.    For providers and/or their staff who would like to refer a patient to [facility], please contact us through our one-stop-shop provider referral line, [facility], at [phone].    If you feel you have received this communication in error or would no longer like to receive these types of communications, please e-mail [email or web address]

## 2020-07-27 NOTE — PROGRESS NOTES
[facility] VISIT NEW PT NOTE    REFERRING PROVIDER: Dr. Owen    CHIEF COMPLAINT: elevated liver enzymes    Interval History: This is a 29 y.o. White transmale, previously seen by DR. Mayers and Jennifer Franklin, NP, and felt to be drug-induced - testosterone in particular, if other etiologies ruled out.  Iron sat 22%, ferritin 88, lipid panel normal, autoimmune markers normal, A1AT MM, 112, IgG 1518 normal,viral hep serologies positive for HBsAb (immune), other markers neg for a, b, c.   Plan was to stop testosterone and see if enzymes improve.      US abd normal with normal spleen and no ascites.  No focal lesion in the liver.     Thus, it appears testosterone might not be causing enzyme elevation.       HPI:  PMH as below referred for evaluation of elevated liver enzymes. Pt follows with endocrinology for gender dysphoria (born female, identifies as male). Has been on testosterone replacement therapy since 8/2017. Since establishing care in 8/2017, his transaminases have been intermittently mildly elevated. Essentially normal in 2/2018 but then since 1/2019 more elevated. Tbili 1.2 on most recent lab. Normal prior. Alk phos has been normal. He has not had any workup for this yet. His weight has increased since 8/2017. Was 165 lbs, now 178 lbs. 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 Has not had abd u/s. Besides TRT, only med is on Synthroid. Denies any herbal supplements. No current significant alcohol use. Reports he used to binge drink when he would go out. Has completely quit alcohol since 11/2018. He is working on losing weight, recently started diet. He feels well.   Denies jaundice, dark urine, abdominal distention, hematemesis, melena, slowed mentation.        Review of patient's allergies indicates:  No Known Allergies    Current Outpatient Medications on File Prior to Visit   Medication Sig Dispense Refill    cholecalciferol, vitamin D3, (VITAMIN  D3) 2,000 unit Cap Take 1 capsule (2,000 Units total) by mouth once daily.      levonorgestreL (MIRENA) 20 mcg/24 hours (5 yrs) 52 mg IUD 1 Intra Uterine Device by Intrauterine route once. for 1 dose (Patient not taking: Reported on 6/19/2020) 1 each 0    levothyroxine (SYNTHROID) 50 MCG tablet Take 1 tablet (50 mcg total) by mouth once daily. 90 tablet 3    multivitamin (ONE DAILY MULTIVITAMIN) per tablet Take 1 tablet by mouth once daily. Has 800 units of Vitamin D      testosterone cypionate (DEPOTESTOTERONE CYPIONATE) 200 mg/mL injection Inject 1 mL (200 mg total) into the muscle every 7 days. Include 3 ml syringe with 18G needle to draw x 10 and 21 G 1  1/2 inch needle to inject x 10 12 mL 5     No current facility-administered medications on file prior to visit.        PMHX:  has a past medical history of Gender dysphoria in adult and Hypothyroidism.    PSHX:  has a past surgical history that includes Tympanostomy tube placement and [place] tooth extraction.    FAMILY HISTORY: Negative for liver disease, reviewed in [facility]    SOCIAL HISTORY:   Social History     Tobacco Use   Smoking Status Former Smoker   Smokeless Tobacco Former User   Tobacco Comment    quit 2/2018       Social History     Substance and Sexual Activity   Alcohol Use Yes    Comment: no alcohol since 11/2018       Social History     Substance and Sexual Activity   Drug Use No     He is in [employer], now in reserves.     ROS:   GENERAL: Denies fever, chills, (+) weight gain, no fatigue  HEENT: Denies headaches, dizziness, vision/hearing changes  CARDIOVASCULAR: Denies chest pain, palpitations, or edema  RESPIRATORY: Denies dyspnea, cough  GI: Denies abdominal pain, rectal bleeding, nausea, vomiting. No change in bowel pattern or color  : Denies dysuria, hematuria   SKIN: Denies rash, itching   NEURO: Denies confusion, memory loss, or mood changes  PSYCH: Denies depression or anxiety  HEME/LYMPH: Denies easy bruising or bleeding        PHYSICAL  EXAM:   Friendly Sukumar bell, in no acute distress; alert and oriented to person, place and time  VITALS: There were no vitals taken for this visit.   HENT: Normocephalic, without obvious abnormality. Oral mucosa pink and moist. Dentition good.  EYES: Sclerae anicteric. No conjunctival pallor.   NECK: Supple. No masses or cervical adenopathy.  CARDIOVASCULAR: Regular rate and rhythm. No murmurs.  RESPIRATORY: Normal respiratory effort. BBS CTA. No wheezes or crackles.  GI: Soft, non-tender, non-distended. No hepatosplenomegaly. No masses palpable. No ascites.  EXTREMITIES:  No clubbing, cyanosis or edema.  SKIN: Warm and dry. No jaundice. No rashes noted to exposed skin. No telangectasias noted. No palmar erythema.  NEURO:  Normal gate. No asterixis.  PSYCH:  Memory intact. Thought and speech pattern appropriate. Behavior normal. No depression or anxiety noted.      RECENT LABS:    Labs:  Lab Results   Component Value Date    WBC 5.30 06/19/2020    HGB 17.1 (H) 06/19/2020    HCT 50.4 (H) 06/19/2020     06/19/2020    CHOL 178 06/19/2020    TRIG 132 06/19/2020    HDL 36 (L) 06/19/2020     06/19/2020    K 4.4 06/19/2020    CREATININE 1.0 06/19/2020    ALT 76 (H) 06/19/2020    AST 48 (H) 06/19/2020    ALKPHOS 70 06/19/2020    BILITOT 1.0 06/19/2020    ALBUMIN 4.4 06/19/2020    INR 1.0 03/06/2019       DIAGNOSTIC STUDIES: none  EGD-  COLONOSCOPY-  ABD. U/S-  CT SCAN-  MRI-  LIVER BIOPSY-    ASSESSMENT:  29 y.o. white transmale with:  1.  Elevated liver enzymes remain elevated.  Patient still on Testosterone.    -- serologic workup and abd u/s negative., therefore will get liver biopsy, discussed getting fibroscan every 6 months and  If fibrosis increases, getting a biopsy, but patient prefers getting a biopsy now.     -- weight has increased some over past 1.5 yrs, could be d/t NAFLD  -- has been on TRT since 8/2017.  2. Gender dysphoria  -- following with endocrine, started on TRT 8/2017  -- TRT could  be possibly contributing to enzyme elevation but no need to d/c at this time. May need trial off TRT in future if no other clear etiology.    Fibroscan 3/6/19 = kPa 5.2, F0-F1, no fibrosis. CAP = 172, < 11% steatosis. Discussed results with pt.          EDUCATION:   We discussed the manifestations of NAFLD. At this time there is no FDA approved therapy for NAFLD.   The patient and I discussed the importance of diet, exercise, and weight loss for management of NAFLD. We discussed a low fat, low carb/low sugar, high fiber diet, and a goal of 30 minutes of exercise 5 times per week.   Pt is aware that fatty liver can progress to steatohepatitis and possibly to cirrhosis, putting one at increased risk for liver cancer, liver failure, and death.      Was Staffed by DR. Mayers at last visit with Jennfier Franklin NP    Mr. Sosa is a 28 year old transgender male (biological female) on testosterone injections was referred to Hepatology for elevated liver enzymes. His BMI is 30. Dx includes BAIG/NAFLD vs testosterone-induced vs others. Agree with work up: serologies/autoimmune markers and ultrasound. After other etiologies are ruled out, may need to avoid testosterone injections to see if these are related to his liver test abnormalities.     PLAN:  1. Labs every 3 months: liver profiile  2. Liver biopsy now.   3. wILL REVIEW BIOPSY IN PATH CONF          Thank you for allowing me to participate in the care of Isidro Sosa    Valeri Ervin MD    Addendum:

## 2020-07-27 NOTE — TELEPHONE ENCOUNTER
----- Message from Valeri Ervin MD sent at 7/27/2020  4:08 PM CDT -----  PLAN:1. Labs every 3 months: liver profiile2. Liver biopsy now. 3. wILL REVIEW BIOPSY IN PATH CONF

## 2020-08-03 ENCOUNTER — TELEPHONE (OUTPATIENT)
Dept: HEPATOLOGY | Facility: CLINIC | Age: 30
End: 2020-08-03

## 2020-08-03 DIAGNOSIS — R74.8 ABNORMAL LIVER ENZYMES: Primary | ICD-10-CM

## 2020-08-03 NOTE — TELEPHONE ENCOUNTER
Received message from Dr SABA Ervin to arrange for the patient to have a Liver Biopsy.    Patient called and agreed to do the Liver Biopsy.  Pre and Post Procedure teaching done with the patient. Denies taking any blood thinners, aspirin, vitamins or herbal supplements.    Patient would like to do the Biopsy the week on 8/17-8/21/20 with Check in Time around 10 or 11 am.   Liver Biopsy placed in the mail.    Message sent to IR

## 2020-08-03 NOTE — TELEPHONE ENCOUNTER
----- Message from Sujey Moore RN sent at 8/3/2020  3:54 PM CDT -----  Noe De Los Santos,    Spoke to the patient about the Liver Biopsy.  Needs Labs scheduled.  Thanks  ----- Message -----  From: Valeri Ervin MD  Sent: 7/27/2020   4:08 PM CDT  To: [facility] Hepat Clinical Staff, Arcadio Trammell Staff    PLAN:  1. Labs every 3 months: liver profiile  2. Liver biopsy now.   3. wILL REVIEW BIOPSY IN PATH CONF

## 2020-08-04 ENCOUNTER — TELEPHONE (OUTPATIENT)
Dept: HEPATOLOGY | Facility: CLINIC | Age: 30
End: 2020-08-04

## 2020-08-04 NOTE — TELEPHONE ENCOUNTER
Received message from PIA/Susan with a tentative date for the US Guided Liver Biopsy. Patient requested the week of 8/17-8/21/20 around 10 or 11 am.  IR message any date for that week for 11 am except 8/18.  Patient called. No Answer. Left VM message.  Will send a My Chart message.

## 2020-08-06 ENCOUNTER — TELEPHONE (OUTPATIENT)
Dept: HEPATOLOGY | Facility: CLINIC | Age: 30
End: 2020-08-06

## 2020-08-06 NOTE — TELEPHONE ENCOUNTER
IR Liver Pathology Conference Note    Patient:  Isidro Sosa  MRN:   [id number]  YOB: 1990  Date of Transplant:  N/A  Native Diagnosis:     Discussion/Plan:    Presenter: Hepatologist Gina Ervin    Reason for presenting: elevated liver function   28 y/o transgender male has elevated AST (48), ALT (76), and negative serologic markers for liver disease.  He is on testosterone and does not want to stop it.  Could be causing elevations, any fibrosis? Would like to see if he could safely continue testosterone. Biopsy to be done on 8/19/2020.     Concerns for Pathologists:     Lab Results  WBC (K/uL)   Date Value   06/19/2020 5.30   06/07/2019 6.11   03/06/2019 6.81     PLT (K/uL)   Date Value   06/19/2020 233   06/07/2019 218   03/06/2019 242     INR (no units)   Date Value   03/06/2019 1.0     AST (U/L)   Date Value   06/19/2020 48 (H)     ALT (U/L)   Date Value   06/19/2020 76 (H)   06/07/2019 59 (H)   06/07/2019 59 (H)     BILITOT (mg/dL)   Date Value   06/19/2020 1.0   06/07/2019 1.2 (H)   06/07/2019 1.2 (H)     ALKPHOS (U/L)   Date Value   06/19/2020 70   06/07/2019 63   06/07/2019 63     CREATININE (mg/dL)   Date Value   06/19/2020 1.0   06/07/2019 0.8   03/06/2019 1.0

## 2020-08-13 ENCOUNTER — TELEPHONE (OUTPATIENT)
Dept: HEPATOLOGY | Facility: CLINIC | Age: 30
End: 2020-08-13

## 2020-08-13 NOTE — TELEPHONE ENCOUNTER
Call returned to the patient at [phone].  Patient is scheduled for US Guided Liver Biopsy on Wed 8/19/20 at 11 am.    Patient stated I am starting classes now on 8/19/20. Can the Liver Biopsy be done some time this week?    Call placed to IR/Susan. Message relayed from the patient. No appt available this week or next week to add on for Liver Biopsies. Try [facility].    Call placed to [facility] IR/Nell.  No appts this week and nothing before 8/19/20.    Call returned to Michael. Message relayed no appts available before 8/19/20.  Michael stated I will have to cancel because I cannot miss the 1st day of school.  I will have to see how things go and reschedule at another time.    Call placed to /[facility] and Biopsy canceled for Wed 8/19/20 at 11 am.

## 2020-08-13 NOTE — TELEPHONE ENCOUNTER
----- Message from Criss Torres sent at 8/13/2020  9:35 AM CDT -----  Regarding: Appt  Contact: Isidro Denise is calling to change date of liver biopsy.      [phone]

## 2020-09-03 ENCOUNTER — TELEPHONE (OUTPATIENT)
Dept: HEPATOLOGY | Facility: CLINIC | Age: 30
End: 2020-09-03

## 2020-09-03 NOTE — TELEPHONE ENCOUNTER
Call returned to the patient. Would like to reschedule Liver Biopsy.  Requesting a Friday in the afternoon appt.  Will send information to IR.

## 2020-09-03 NOTE — TELEPHONE ENCOUNTER
----- Message from Magali Bartlett MA sent at 9/3/2020 10:04 AM CDT -----  Contact: pt    ----- Message -----  From: Denisse Brothers  Sent: 9/3/2020   9:45 AM CDT  To: [facility] Staff    Patient calling to speak with nurse about Biopsy         Call Back : [phone]

## 2020-09-08 ENCOUNTER — TELEPHONE (OUTPATIENT)
Dept: HEPATOLOGY | Facility: CLINIC | Age: 30
End: 2020-09-08

## 2020-09-08 NOTE — TELEPHONE ENCOUNTER
Received messaged from IR with another date for the patients rescheduled Random US Liver Biopsy. Pt requested any Fri in the Afternoon.  Date available is for Fri 9/25/20 at 12 pm.    Patient called [phone]. Date and time is acceptable. New message to My Chart and new appt letter placed in the mail.  Voiced understanding.

## 2020-09-18 ENCOUNTER — LAB VISIT (OUTPATIENT)
Dept: LAB | Facility: HOSPITAL | Age: 30
End: 2020-09-18
Attending: INTERNAL MEDICINE
Payer: OTHER GOVERNMENT

## 2020-09-18 DIAGNOSIS — R74.8 ABNORMAL LIVER ENZYMES: ICD-10-CM

## 2020-09-18 LAB
ALBUMIN SERPL BCP-MCNC: 4.7 G/DL (ref 3.5–5.2)
ALP SERPL-CCNC: 78 U/L (ref 55–135)
ALT SERPL W/O P-5'-P-CCNC: 89 U/L (ref 10–44)
AST SERPL-CCNC: 57 U/L (ref 10–40)
BILIRUB DIRECT SERPL-MCNC: 0.1 MG/DL (ref 0.1–0.3)
BILIRUB SERPL-MCNC: 0.9 MG/DL (ref 0.1–1)
PROT SERPL-MCNC: 8.5 G/DL (ref 6–8.4)

## 2020-09-18 PROCEDURE — 36415 COLL VENOUS BLD VENIPUNCTURE: CPT

## 2020-09-18 PROCEDURE — 80076 HEPATIC FUNCTION PANEL: CPT

## 2020-09-22 DIAGNOSIS — R74.8 ABNORMAL LIVER ENZYMES: Primary | ICD-10-CM

## 2020-09-23 DIAGNOSIS — R74.8 ABNORMAL LIVER ENZYMES: Primary | ICD-10-CM

## 2020-09-23 RX ORDER — MIDAZOLAM HYDROCHLORIDE 1 MG/ML
1 INJECTION INTRAMUSCULAR; INTRAVENOUS
Status: CANCELLED | OUTPATIENT
Start: 2020-09-23

## 2020-09-23 RX ORDER — FENTANYL CITRATE 50 UG/ML
50 INJECTION, SOLUTION INTRAMUSCULAR; INTRAVENOUS
Status: CANCELLED | OUTPATIENT
Start: 2020-09-23

## 2020-09-25 ENCOUNTER — HOSPITAL ENCOUNTER (OUTPATIENT)
Facility: HOSPITAL | Age: 30
Discharge: HOME | End: 2020-09-25
Attending: RADIOLOGY | Admitting: RADIOLOGY
Payer: OTHER GOVERNMENT

## 2020-09-25 VITALS
SYSTOLIC BLOOD PRESSURE: 108 MMHG | DIASTOLIC BLOOD PRESSURE: 62 MMHG | RESPIRATION RATE: 18 BRPM | TEMPERATURE: 98 F | BODY MASS INDEX: 29.88 KG/M2 | OXYGEN SATURATION: 99 % | HEIGHT: 64 IN | HEART RATE: 66 BPM | WEIGHT: 175 LBS

## 2020-09-25 DIAGNOSIS — R74.8 ABNORMAL LIVER ENZYMES: ICD-10-CM

## 2020-09-25 LAB
B-HCG UR QL: NEGATIVE
CTP QC/QA: YES

## 2020-09-25 PROCEDURE — 88342 IMHCHEM/IMCYTCHM 1ST ANTB: CPT | Performed by: PATHOLOGY

## 2020-09-25 PROCEDURE — 81025 URINE PREGNANCY TEST: CPT | Performed by: RADIOLOGY

## 2020-09-25 PROCEDURE — 88313 SPECIAL STAINS GROUP 2: CPT | Mod: 26,,, | Performed by: PATHOLOGY

## 2020-09-25 PROCEDURE — 88313 PR  SPECIAL STAINS,GROUP II: ICD-10-PCS | Mod: 26,,, | Performed by: PATHOLOGY

## 2020-09-25 PROCEDURE — 88307 PR  SURG PATH,LEVEL V: ICD-10-PCS | Mod: 26,,, | Performed by: PATHOLOGY

## 2020-09-25 PROCEDURE — 88307 TISSUE EXAM BY PATHOLOGIST: CPT | Performed by: PATHOLOGY

## 2020-09-25 PROCEDURE — 88342 CHG IMMUNOCYTOCHEMISTRY: ICD-10-PCS | Mod: 26,,, | Performed by: PATHOLOGY

## 2020-09-25 PROCEDURE — 25000003 PHARM REV CODE 250: Performed by: STUDENT IN AN ORGANIZED HEALTH CARE EDUCATION/TRAINING PROGRAM

## 2020-09-25 PROCEDURE — 63600175 PHARM REV CODE 636 W HCPCS: Performed by: STUDENT IN AN ORGANIZED HEALTH CARE EDUCATION/TRAINING PROGRAM

## 2020-09-25 PROCEDURE — 88307 TISSUE EXAM BY PATHOLOGIST: CPT | Mod: 26,,, | Performed by: PATHOLOGY

## 2020-09-25 PROCEDURE — 88313 SPECIAL STAINS GROUP 2: CPT | Mod: 59 | Performed by: PATHOLOGY

## 2020-09-25 PROCEDURE — 88342 IMHCHEM/IMCYTCHM 1ST ANTB: CPT | Mod: 26,,, | Performed by: PATHOLOGY

## 2020-09-25 RX ORDER — SODIUM CHLORIDE 9 MG/ML
INJECTION, SOLUTION INTRAVENOUS CONTINUOUS
Status: DISCONTINUED | OUTPATIENT
Start: 2020-09-25 | End: 2021-02-15 | Stop reason: HOSPADM

## 2020-09-25 RX ORDER — FENTANYL CITRATE 50 UG/ML
INJECTION, SOLUTION INTRAMUSCULAR; INTRAVENOUS CODE/TRAUMA/SEDATION MEDICATION
Status: COMPLETED | OUTPATIENT
Start: 2020-09-25 | End: 2020-09-25

## 2020-09-25 RX ORDER — MIDAZOLAM HYDROCHLORIDE 1 MG/ML
INJECTION INTRAMUSCULAR; INTRAVENOUS CODE/TRAUMA/SEDATION MEDICATION
Status: COMPLETED | OUTPATIENT
Start: 2020-09-25 | End: 2020-09-25

## 2020-09-25 RX ADMIN — MIDAZOLAM HYDROCHLORIDE 1 MG: 1 INJECTION, SOLUTION INTRAMUSCULAR; INTRAVENOUS at 02:09

## 2020-09-25 RX ADMIN — GELATIN ABSORBABLE SPONGE 12-7 MM 1 EACH: 12-7 MISC at 02:09

## 2020-09-25 RX ADMIN — FENTANYL CITRATE 50 MCG: 50 INJECTION, SOLUTION INTRAMUSCULAR; INTRAVENOUS at 02:09

## 2020-09-25 RX ADMIN — FENTANYL CITRATE 25 MCG: 50 INJECTION, SOLUTION INTRAMUSCULAR; INTRAVENOUS at 02:09

## 2020-10-01 LAB
COMMENT: NORMAL
FINAL PATHOLOGIC DIAGNOSIS: NORMAL
GROSS: NORMAL

## 2020-10-02 ENCOUNTER — TELEPHONE (OUTPATIENT)
Dept: HEPATOLOGY | Facility: CLINIC | Age: 30
End: 2020-10-02

## 2020-10-02 NOTE — TELEPHONE ENCOUNTER
----- Message from Valeri Ervin MD sent at 10/2/2020  2:00 AM CDT -----  Native liver (biopsy):   - Benign non-cirrhotic liver (see comment)   - Microvesicular steatosis, 5%; no steatohepatitis   - Rare glycogenated hepatocytes     Please inform patient:  Biopsy reviewed with pathologist. Does not appear to be drug-induced, so ok to take current meds.including testosterone

## 2020-10-22 ENCOUNTER — OFFICE VISIT (OUTPATIENT)
Dept: INTERNAL MEDICINE | Facility: CLINIC | Age: 30
End: 2020-10-22
Payer: OTHER GOVERNMENT

## 2020-10-22 ENCOUNTER — IMMUNIZATION (OUTPATIENT)
Dept: INTERNAL MEDICINE | Facility: CLINIC | Age: 30
End: 2020-10-22
Attending: RADIOLOGY
Payer: OTHER GOVERNMENT

## 2020-10-22 VITALS
BODY MASS INDEX: 30.26 KG/M2 | SYSTOLIC BLOOD PRESSURE: 118 MMHG | WEIGHT: 177.25 LBS | DIASTOLIC BLOOD PRESSURE: 68 MMHG | HEIGHT: 64 IN | HEART RATE: 67 BPM

## 2020-10-22 DIAGNOSIS — R74.8 ABNORMAL LIVER ENZYMES: ICD-10-CM

## 2020-10-22 DIAGNOSIS — M54.9 MID BACK PAIN: ICD-10-CM

## 2020-10-22 DIAGNOSIS — E55.9 VITAMIN D DEFICIENCY: ICD-10-CM

## 2020-10-22 DIAGNOSIS — Z00.00 ANNUAL PHYSICAL EXAM: Primary | ICD-10-CM

## 2020-10-22 DIAGNOSIS — E03.9 ACQUIRED HYPOTHYROIDISM: ICD-10-CM

## 2020-10-22 PROCEDURE — 99395 PR PREVENTIVE VISIT,EST,18-39: ICD-10-PCS | Mod: S$PBB,,, | Performed by: INTERNAL MEDICINE

## 2020-10-22 PROCEDURE — 99395 PREV VISIT EST AGE 18-39: CPT | Mod: S$PBB,,, | Performed by: INTERNAL MEDICINE

## 2020-10-22 PROCEDURE — 90686 IIV4 VACC NO PRSV 0.5 ML IM: CPT | Mod: PBBFAC

## 2020-10-22 PROCEDURE — 99999 PR PBB SHADOW E&M-EST. PATIENT-LVL IV: ICD-10-PCS | Mod: PBBFAC,,, | Performed by: INTERNAL MEDICINE

## 2020-10-22 PROCEDURE — 99214 OFFICE O/P EST MOD 30 MIN: CPT | Mod: PBBFAC,25 | Performed by: INTERNAL MEDICINE

## 2020-10-22 PROCEDURE — 99999 PR PBB SHADOW E&M-EST. PATIENT-LVL IV: CPT | Mod: PBBFAC,,, | Performed by: INTERNAL MEDICINE

## 2020-10-22 RX ORDER — LEVOTHYROXINE SODIUM 50 UG/1
50 TABLET ORAL DAILY
Qty: 90 TABLET | Refills: 3 | Status: CANCELLED | OUTPATIENT
Start: 2020-10-22 | End: 2021-10-22

## 2020-10-22 RX ORDER — TESTOSTERONE CYPIONATE 200 MG/ML
200 INJECTION, SOLUTION INTRAMUSCULAR
Qty: 12 ML | Refills: 5 | Status: CANCELLED | OUTPATIENT
Start: 2020-10-22 | End: 2020-11-21

## 2021-02-09 PROBLEM — Z11.3 SCREEN FOR STD (SEXUALLY TRANSMITTED DISEASE): Status: RESOLVED | Noted: 2020-03-15 | Resolved: 2021-02-09

## 2021-06-19 DIAGNOSIS — F64.0 GENDER DYSPHORIA IN ADULT: ICD-10-CM

## 2021-06-21 RX ORDER — TESTOSTERONE CYPIONATE 200 MG/ML
100 INJECTION, SOLUTION INTRAMUSCULAR
Qty: 10 ML | Refills: 5 | Status: SHIPPED | OUTPATIENT
Start: 2021-06-21 | End: 2021-07-21